# Patient Record
Sex: FEMALE | Race: WHITE | NOT HISPANIC OR LATINO | Employment: OTHER | URBAN - METROPOLITAN AREA
[De-identification: names, ages, dates, MRNs, and addresses within clinical notes are randomized per-mention and may not be internally consistent; named-entity substitution may affect disease eponyms.]

---

## 2017-04-27 ENCOUNTER — CONVERSION ENCOUNTER (OUTPATIENT)
Dept: MAMMOGRAPHY | Facility: HOSPITAL | Age: 52
End: 2017-04-27

## 2018-02-14 ENCOUNTER — OFFICE VISIT CONVERTED (OUTPATIENT)
Dept: SURGERY | Facility: CLINIC | Age: 53
End: 2018-02-14
Attending: SURGERY

## 2018-04-10 ENCOUNTER — OFFICE VISIT CONVERTED (OUTPATIENT)
Dept: SURGERY | Facility: CLINIC | Age: 53
End: 2018-04-10
Attending: NURSE PRACTITIONER

## 2018-08-13 ENCOUNTER — CONVERSION ENCOUNTER (OUTPATIENT)
Dept: MAMMOGRAPHY | Facility: HOSPITAL | Age: 53
End: 2018-08-13

## 2020-07-10 ENCOUNTER — HOSPITAL ENCOUNTER (OUTPATIENT)
Dept: GENERAL RADIOLOGY | Facility: HOSPITAL | Age: 55
Discharge: HOME | End: 2020-07-10
Attending: NURSE PRACTITIONER

## 2020-07-27 ENCOUNTER — CONVERSION ENCOUNTER (OUTPATIENT)
Dept: ORTHOPEDIC SURGERY | Facility: CLINIC | Age: 55
End: 2020-07-27

## 2020-07-27 ENCOUNTER — OFFICE VISIT CONVERTED (OUTPATIENT)
Dept: ORTHOPEDIC SURGERY | Facility: CLINIC | Age: 55
End: 2020-07-27
Attending: ORTHOPAEDIC SURGERY

## 2020-09-18 ENCOUNTER — HOSPITAL ENCOUNTER (OUTPATIENT)
Dept: GENERAL RADIOLOGY | Facility: HOSPITAL | Age: 55
Discharge: HOME | End: 2020-09-18
Attending: ORTHOPAEDIC SURGERY

## 2020-09-23 ENCOUNTER — OFFICE VISIT CONVERTED (OUTPATIENT)
Dept: ORTHOPEDIC SURGERY | Facility: CLINIC | Age: 55
End: 2020-09-23
Attending: ORTHOPAEDIC SURGERY

## 2020-09-23 ENCOUNTER — CONVERSION ENCOUNTER (OUTPATIENT)
Dept: ORTHOPEDIC SURGERY | Facility: CLINIC | Age: 55
End: 2020-09-23

## 2021-04-14 ENCOUNTER — OFFICE VISIT CONVERTED (OUTPATIENT)
Dept: SURGERY | Facility: CLINIC | Age: 56
End: 2021-04-14
Attending: NURSE PRACTITIONER

## 2021-05-06 ENCOUNTER — HOSPITAL ENCOUNTER (OUTPATIENT)
Dept: PREADMISSION TESTING | Facility: HOSPITAL | Age: 56
Discharge: HOME | End: 2021-05-06
Attending: SURGERY

## 2021-05-06 LAB — SARS-COV-2 RNA SPEC QL NAA+PROBE: NOT DETECTED

## 2021-05-10 NOTE — H&P
History and Physical      Patient Name: Izabela Shipman   Patient ID: [id number]   Sex: Female   YOB: 1965    Primary Care Provider: Jalyn ARMANDO   Referring Provider: Jalyn ARMANDO    Visit Date: July 27, 2020    Provider: Nash Bautista MD   Location: [facility]   Location Address: [address] [place] [address]  [id number]   Location Phone: [phone]          Chief Complaint  · Right elbow pain   · Right knee pain       History Of Present Illness  Izabela Shipman is a 54 year old /White female who is here for right knee and right elbow pain. The elbow pain started 6 months ago and the knee pain started 1 year ago without injury or trauma. Patient states she does a lot of repetitive motion with her arm, resulting in pain on the lateral side. Patient states pain with gripping, denies numbness or tingling. Patient states right knee pain is on the medial side. She states her knee gives-way and pain with going down stairs. She has tried Voltaren gel and pain medication.       Past Medical History  Arthritis; Hyperthyroidism; Hypothyroidism; Limb Swelling; Stress Incontinence; Thyroid disorder; Ulcer; Urge Incontinence         Past Surgical History  Colonoscopy; Colonoscopy and upper gastrointestinal endoscopy; Hysterectomy; Metal implants; Neck Surgery; Rotator Cuff repair; [place] Tooth Extraction         Medication List  buspirone 30 mg oral tablet; Carafate 100 mg/mL oral suspension; Nexium 40 mg oral capsule,delayed release(DR/EC); NP Thyroid 90 mg oral tablet; Once Daily oral tablet; oxybutynin chloride 5 mg oral tablet extended release 24hr; pantoprazole 40 mg oral tablet,delayed release (DR/EC); venlafaxine 75 mg oral tablet         Allergy List  NO KNOWN DRUG ALLERGIES         Family Medical History  Stroke; Heart Disease; Cancer, Unspecified; Diabetes, unspecified type; Renal Calculus; Family history of colon cancer; Family history of breast cancer         Social  "History  Alcohol (Never); Alcohol Use (Never); Caffeine (Never); Homemaker.; lives alone; Recreational Drug Use (Never); Second hand smoke exposure (Never); Single.; Tobacco (Current some day)         Review of Systems  · Constitutional  o Denies  o : fever, chills, weight loss  · Cardiovascular  o Denies  o : chest pain, shortness of breath  · Gastrointestinal  o Denies  o : liver disease, heartburn, nausea, blood in stools  · Genitourinary  o Denies  o : painful urination, blood in urine  · Integument  o Denies  o : rash, itching  · Neurologic  o Denies  o : headache, weakness, loss of consciousness  · Musculoskeletal  o Admits  o : painful, swollen joints  · Psychiatric  o Admits  o : anxiety, depression  o Denies  o : drug/alcohol addiction      Vitals  Date Time BP Position Site L\R Cuff Size HR RR TEMP (F) WT  HT  BMI kg/m2 BSA m2 O2 Sat        07/27/2020 02:00 PM      101 - R   153lbs 0oz 5'  1\" 28.91 1.73 97 %          Physical Examination  · Constitutional  o Appearance  o : well developed, well-nourished, no obvious deformities present  · Head and Face  o Head  o :   § Inspection  § : normocephalic  o Face  o :   § Inspection  § : no facial lesions  · Eyes  o Conjunctivae  o : conjunctivae normal  o Sclerae  o : sclerae white  · Ears, Nose, Mouth and Throat  o Ears  o :   § External Ears  § : appearance within normal limits  § Hearing  § : intact  o Nose  o :   § External Nose  § : appearance normal  · Neck  o Inspection/Palpation  o : normal appearance  o Range of Motion  o : full range of motion  · Respiratory  o Respiratory Effort  o : breathing unlabored  o Inspection of Chest  o : normal appearance  o Auscultation of Lungs  o : no audible wheezing or rales  · Cardiovascular  o Heart  o : regular rate  · Gastrointestinal  o Abdominal Examination  o : soft and non-tender  · Skin and Subcutaneous Tissue  o General Inspection  o : intact, no rashes  · Psychiatric  o General  o : Alert and oriented " x3  o Judgement and Insight  o : judgment and insight intact  o Mood and Affect  o : mood normal, affect appropriate  · Right Elbow  o Inspection  o : Mild swelling. No skin discoloration. Palpable tenderness over the lateral epicondyle. Full extension. Full flexion. Full supination and pronation. Neurovascularly grossly intact. Sensation grossly intact. 2+ radial and ulnar pulses.   · Right Knee  o Inspection  o : No skin discoloration, atrophy, or swelling. Palpable tenderness on medial joint line. Full extension. Full flexion. Positive Marilyn's. Positive Apley's. Calf supple and nontender. Neurovascularly grossly intact. Sensation grossly intact. 2+ dorsal pedal and posterior tibialis pulses.   · Injection Note/Aspiration Note  o Site  o : right elbow  o Procedure  o : After educating the patient, patient gave consent for procedure. After using Chloraprep, the injection was given. The patient tolerated the procedure well.  o Medication  o : 80 mg of DepoMedrol with 1cc of 1% Lidocaine  · Imaging  o Imaging  o : X-rays of right knee performed at [facility] on 07/10/2020 reviewed and are negative for fracture or dislocation.               Assessment  · Lateral epicondylitis of right elbow     726.32/M77.11  · Right elbow pain     719.42/M25.521  · Right knee pain, unspecified chronicity     719.46/M25.561      Plan  · Orders  o Depo-Medrol injection 80mg () - - 07/27/2020   Lot [id number] Exp 07 2021 [employer] Administered by ALLYSON Bautista MD  o Elbow-Lat Single Tendon (Injection) (35331) - - 07/27/2020   Lot [id number] Exp 02 2022 [employer] Administered by ALLYSON Bautista MD  · Medications  o Medications have been Reconciled  o Transition of Care or Provider Policy  · Instructions  o Reviewed the patient's Past Medical, Social, and Family history as well as the ROS at today's visit, no changes.  o Call or return if worsening symptoms.  o Exercise handout given.  o Follow up after MRI  of right knee.  o Right elbow injection.  o This note was transcribed by Kitty lacey/vianney.            Electronically Signed by: Kitty Interiano-, Other -Author on July 29, 2020 08:33:46 PM  Electronically Co-signed by: THERESA Koch-MARILYN -Reviewer on July 30, 2020 09:09:04 AM  Electronically Co-signed by: Nash Bautista MD -Reviewer on July 31, 2020 12:28:36 PM

## 2021-05-11 ENCOUNTER — HOSPITAL ENCOUNTER (OUTPATIENT)
Dept: GASTROENTEROLOGY | Facility: HOSPITAL | Age: 56
Setting detail: HOSPITAL OUTPATIENT SURGERY
Discharge: HOME | End: 2021-05-11
Attending: SURGERY

## 2021-05-11 LAB
C DIFF TOX B STL QL CT TISS CULT: NEGATIVE
CONV 027 TOXIN: NEGATIVE

## 2021-05-11 NOTE — H&P
History and Physical      Patient Name: Izabela Shipman   Patient ID: [id number]   Sex: Female   YOB: 1965    Primary Care Provider: Jalyn ARMANDO   Referring Provider: Jalyn ARMANDO    Visit Date: April 14, 2021    Provider: TR Valentino   Location: [facility]   Location Address: [address] [place] [address]  [id number]   Location Phone: [phone]          Chief Complaint  · Age 50 or over  · FH of colon cancer  · Abdominal Pain  · Difficulty Swallowing  · Diarrhea  · GERD  · Requesting EGD and Colonoscopy      History Of Present Illness  The patient is a 55 year old /White female presenting to the Surgical Specialist office on a referral from Jalyn ARMANDO.   Izabela Shipman needs to have a diagnostic colonoscopy and EGD.   Patient states that they have had a colonoscopy. 3 years ago   Patient currently complains of: diarrhea, GERD, difficulty swallowing, and abdominal pain   Patient Does have family history of colon cancer. Mother and 2 maternal uncles      Patient presents today on referral from Dr. Rubio Keenan.  Patient presents today with complaints of dysphagia, GERD, lower abdominal pain and diarrhea.  Patient reports that she has been having trouble swallowing pills and foods especially breads and meats, feeling as if they get stuck in her anterior chest.  Reports that she has GERD symptoms and takes Pepcid.  Admits to lower abdominal pain and diarrhea for the last 6 months.  Admits to family history of colorectal cancer with her mother and 2 maternal uncles.    3/18: EGD & Colonoscopy ([facility]): Erosive esophagitis; gastritis; normal colon.     3/16: EGD ([facility]): gastritis; duodenitis.    1/15: EGD & Colonoscopy ([facility]); Reflux esophagitis; gastritis; hiatal hernia; normal colon.     5/11: EGD ([facility]); H. pylori gastritis.    12/07: Colonoscopy ([facility]); normal colon.     4/02: Colonoscopy ([facility]); normal colon; proctitis; internal  hemorrhoids.       Past Medical History  Disease Name Date Onset Notes   Arthritis --  --    Bladder disorder --  --    Hyperthyroidism --  --    Hypothyroidism --  --    Limb Swelling --  --    Stress Incontinence 09/22/2016 --    Thyroid disorder --  --    Ulcer --  --    Urge Incontinence 09/22/2016 --          Past Surgical History  Procedure Name Date Notes   Colonoscopy --  --    Colonoscopy and upper gastrointestinal endoscopy --  --    Hysterectomy --  --    Hysterectomy-Abdominal --  --    Metal implants --  --    Neck Surgery --  --    Rotator Cuff repair --  --    [place] Tooth Extraction --  --          Medication List  Name Date Started Instructions   desvenlafaxine 50 mg oral tablet extended release 24 hr  take 1 tablet (50 mg) by oral route once daily   levothyroxine 100 mcg oral capsule  take 1 capsule (100 mcg) by oral route once daily   Nexium 40 mg oral capsule,delayed release(/EC) 04/10/2018 take 1 capsule (40 mg) by oral route once daily for 30 days   Once Daily oral tablet  take 1 tablet by oral route daily   oxcarbazepine 300 mg oral tablet  take 1 tablet (300 mg) by oral route once daily   Valium 2 mg oral tablet  take 1 tablet (2 mg) by oral route 3 times per day as needed         Allergy List  Allergen Name Date Reaction Notes   NO KNOWN DRUG ALLERGIES --  --  --        Allergies Reconciled  Family Medical History  Disease Name Relative/Age Notes   Stroke Brother/   Brother   Heart Disease Brother/   Brother   Cancer, Unspecified Brother/  Mother/   Mother; Brother   Diabetes, unspecified type Brother/   Brother   Renal Calculus Father/   Father   Family history of colon cancer Mother/50s  Uncle/   Mother/50s; Uncle/50s  Mother/50s; Uncle/50s  Mother/50s   Family history of breast cancer  Aunt/60s  Aunt/60s  Aunt/50s  Aunt/60s   Bladder calculus Father/   Father         Social History  Finding Status Start/Stop Quantity Notes   Alcohol Never --/-- --  04/14/2021 - drinks no   Alcohol  "Use Never --/-- --  does not drink   Caffeine Never --/-- --  drinks no  drinks occasionally; coffee; 1-2 times per day   Homemaker. --  --/-- --  --    lives alone --  --/-- --  --    Recreational Drug Use Never --/-- --  no   Second hand smoke exposure Never --/-- --  no  yes   Single. --  --/-- --  --    Tobacco Current every day --/-- 1 PPD current some day smoker, 0.5 pack per day, smoked 21-30 years  former smoker; started smoking at age 3; quit smoking at age 51; smoked 1 cigarette(s) per day  current everyday smoker; started smoking at age 3; smoked 2 cigarette(s) per day  current everyday smoker; started smoking at age 17; smoked 1 cigarette(s) per day         Review of Systems  · Constitutional  o Denies  o : fever, chills  · Eyes  o Denies  o : yellowish discoloration of eyes  · HENT  o Denies  o : difficulty swallowing  · Cardiovascular  o Denies  o : chest pain, chest pain on exertion  · Respiratory  o Denies  o : shortness of breath  · Gastrointestinal  o Admits  o : diarrhea, dysphagia, heartburn, reflux, abdominal pain  o Denies  o : nausea, vomiting, constipation  · Genitourinary  o Denies  o : abnormal color of urine  · Integument  o Denies  o : rash  · Neurologic  o Denies  o : tingling or numbness  · Musculoskeletal  o Denies  o : joint pain  · Endocrine  o Denies  o : weight gain, weight loss      Vitals  Date Time BP Position Site L\R Cuff Size HR RR TEMP (F) WT  HT  BMI kg/m2 BSA m2 O2 Sat FR L/min FiO2        04/14/2021 01:29 PM       13  153lbs 2oz 5'  1\" 28.93 1.73             Physical Examination  · Constitutional  o Appearance  o : well developed, well-nourished, patient in no apparent distress  · Head and Face  o Head  o :   § Inspection  § : atraumatic, normocephalic  o Face  o :   § Inspection  § : no facial lesions  · Eyes  o Conjunctivae  o : conjunctivae normal  o Sclerae  o : sclerae white  · Neck  o Inspection/Palpation  o : normal appearance, no masses or tenderness, " trachea midline  · Respiratory  o Respiratory Effort  o : breathing unlabored  · Skin and Subcutaneous Tissue  o General Inspection  o : no lesions present, no areas of discoloration, skin turgor normal, texture normal  · Neurologic  o Mental Status Examination  o :   § Orientation  § : grossly oriented to person, place and time  § Attention  § : attention normal, concentration abilities normal  § Fund of Knowledge  § : fund of knowledge within normal limits, patient aware of current events  o Gait and Station  o : normal gait, able to stand without difficulty  · Psychiatric  o Judgement and Insight  o : judgment and insight intact  o Mood and Affect  o : mood normal, affect appropriate              Assessment  · Diarrhea     787.91/R19.7  · Difficulty in swallowing     787.20/R13.10  · Family History of Colon Cancer     V16.0/Z80.0  · GERD (gastroesophageal reflux disease)     530.81/K21.9  · Pre-op testing     V72.84/Z01.818    Problems Reconciled  Plan  · Orders  o Consent for Colonoscopy with Possible Biopsy - Possible risks/complications, benefits, and alternatives to surgical or invasive procedure have been explained to patient and/or legal guardian. -Patient has been evaluated and can tolerate anesthesia and/or sedation. Risks, benefits, and alternatives to anesthesia and sedation have been explained to patient and/or legal guardian. (51077) - 787.91/R19.7, V16.0/Z80.0, 787.20/R13.10, 530.81/K21.9 - 05/11/2021  o Consent for Esophagogastroduodenoscopy (EGD) with dilation - Possible risks/complications, benefits, and alternatives to surgical or invasive procedure have been explained to patient and/or legal guardian. - Patient has been evaluated and can tolerate anesthesia and/or sedation. Risks, benefits, and alternatives to anesthesia and sedation have been explained to patient and/or legal guardian. (60446) - 787.91/R19.7, V16.0/Z80.0, 787.20/R13.10, 530.81/K21.9 - 05/11/2021  o [facility] Pre-Op Covid-19  Screening (22861) - V72.84/Z01.818 - 05/06/2021   [facility]  · Medications  o Medications have been Reconciled  o Transition of Care or Provider Policy  · Instructions  o Surgical Facility: [facility]  o Handouts Provided Pre-Procedure Instructions including date, time, and location of procedure.   o PLAN: Proceeed with colonoscopy. Patient understands risks/benefits and is willing to proceed.   o PLAN: Proceeed with EGD. Patient understands risks/benefits and is willing to proceed.   o ***Surgical Orders***  o RISK AND BENEFITS:  o Given these options, the patient has verbally expressed an understanding of the risks of the surgery and finds these risks acceptable. Will proceed with surgery as soon as possible.  o O.R. PREP: Per protocol   o IV: Per Anesthesia  o Please sign permit for: Colonoscopy with possible biopsies by Dr. Clark.  o Please sign permit for: Esophagogastroduodenoscopy with possible biopsies and dilation by Dr. Clark.   o The above History and Physical Examination has been completed within 30 days of admission.  o ***Patient Status***  o Outpatient  o Follow up in the in the office post procedure.  o Electronically Identified Patient Education Materials Provided Electronically  · Disposition  o EMR dragon/transcription disclaimer: Much of this encounter note is an electronic transcription/translation of spoken language to printed text. Electronic translation of spoken language may permit erroneous, or at times nonsensical words or phrases to be inadvertently trasncribed; although I have reviewed the note for such errors, some may still exist.  · Referrals  o ID: [id number] Date: 04/14/2021 Type: Inbound  Specialty: General Surgery            Electronically Signed by: TR Valentino -Author on April 14, 2021 02:17:50 PM

## 2021-05-13 NOTE — PROGRESS NOTES
Progress Note      Patient Name: Izabela Shipman   Patient ID: [id number]   Sex: Female   YOB: 1965    Primary Care Provider: Jalyn ARMANDO   Referring Provider: Jalyn ARMANDO    Visit Date: September 23, 2020    Provider: Nash Bautista MD   Location: [facility]   Location Address: [address] [place] [address]  [id number]   Location Phone: [phone]          Chief Complaint  · Right Knee Pain  · Right Shoulder Pain      History Of Present Illness  Izabela Shipman is a 54 year old /White female who presents today to [facility].      Patient presents today with a follow-up for right knee pain and right shoulder pain. Patient has had shoulder pain that started 6 months ago and the knee pain that started 1 year ago. Patient denies any trauma or injury to her knee. Patient has been experiencing pain with gripping but denies numbness and tingling. Patient states she feels like that same kind of pain from 7 years ago when she tore her RTC. Patient is experiencing pain on the medial aspect of her knee and states her knees gives way when she walks down the stairs. Patient presents today with MRI results of her knee.                  Past Medical History  Arthritis; Hyperthyroidism; Hypothyroidism; Limb Swelling; Stress Incontinence; Thyroid disorder; Ulcer; Urge Incontinence         Past Surgical History  Colonoscopy; Colonoscopy and upper gastrointestinal endoscopy; Hysterectomy; Metal implants; Neck Surgery; Rotator Cuff repair; [place] Tooth Extraction         Medication List  buspirone 30 mg oral tablet; Carafate 100 mg/mL oral suspension; Nexium 40 mg oral capsule,delayed release(DR/EC); NP Thyroid 90 mg oral tablet; Once Daily oral tablet; oxybutynin chloride 5 mg oral tablet extended release 24hr; pantoprazole 40 mg oral tablet,delayed release (DR/EC); venlafaxine 75 mg oral tablet         Allergy List  NO KNOWN DRUG ALLERGIES       Allergies  "Reconciled  Family Medical History  Stroke; Heart Disease; Cancer, Unspecified; Diabetes, unspecified type; Renal Calculus; Family history of colon cancer; Family history of breast cancer         Social History  Alcohol (Never); Alcohol Use (Never); Caffeine (Never); Homemaker.; lives alone; Recreational Drug Use (Never); Second hand smoke exposure (Never); Single.; Tobacco (Current some day)         Review of Systems  · Constitutional  o Denies  o : fever, chills, weight loss  · Cardiovascular  o Denies  o : chest pain, shortness of breath  · Gastrointestinal  o Denies  o : liver disease, heartburn, nausea, blood in stools  · Genitourinary  o Denies  o : painful urination, blood in urine  · Integument  o Denies  o : rash, itching  · Neurologic  o Denies  o : headache, weakness, loss of consciousness  · Musculoskeletal  o Denies  o : painful, swollen joints  · Psychiatric  o Denies  o : drug/alcohol addiction, anxiety, depression      Vitals  Date Time BP Position Site L\R Cuff Size HR RR TEMP (F) WT  HT  BMI kg/m2 BSA m2 O2 Sat        09/23/2020 09:14 AM      73 - R   151lbs 0oz 5'  1\" 28.53 1.72 99 %          Physical Examination  · Constitutional  o Appearance  o : well developed, well-nourished, no obvious deformities present  · Head and Face  o Head  o :   § Inspection  § : normocephalic  o Face  o :   § Inspection  § : no facial lesions  · Eyes  o Conjunctivae  o : conjunctivae normal  o Sclerae  o : sclerae white  · Ears, Nose, Mouth and Throat  o Ears  o :   § External Ears  § : appearance within normal limits  § Hearing  § : intact  o Nose  o :   § External Nose  § : appearance normal  · Neck  o Inspection/Palpation  o : normal appearance  o Range of Motion  o : full range of motion  · Respiratory  o Respiratory Effort  o : breathing unlabored  o Inspection of Chest  o : normal appearance  o Auscultation of Lungs  o : no audible wheezing or rales  · Cardiovascular  o Heart  o : regular " rate  · Gastrointestinal  o Abdominal Examination  o : soft and non-tender  · Skin and Subcutaneous Tissue  o General Inspection  o : intact, no rashes  · Psychiatric  o General  o : Alert and oriented x3  o Judgement and Insight  o : judgment and insight intact  o Mood and Affect  o : mood normal, affect appropriate  · Right Shoulder  o Inspection  o : Sensation grossly intact. Neurovascular intact. Pulses pleasant. No swelling, skin discoloration or atrophy. Pain with drop arm test and empty can test. Mild pain with Hawkin's and Neer's. Good tone of deltoid, biceps, triceps, wrist extensors, and wrist flexors. Shoulder ROM with pain.   · Right Knee  o Inspection  o : No swelling, skin discoloration or atrophy. Tenderness on medial joint line. Full flexion and extension. Positive Apley's. Positive McMurrays. Calf supple, non-tender. Non-tender patella. 2+ dorsal pedal and posterior tibialis pulses. Good strength in quadriceps, hamstrings, dorsiflexors, and plantar flexors. Antalgic gait.   · In Office Procedures  o View  o : AP/LATERAL  o Site  o : right, shoulder   o Indication  o : Right shoulder pain   o Study  o : X-rays ordered, taken in the office, and reviewed today.  · Imaging  o Imaging  o : [MRI RIGHT KNEE] 1. Complex tear of the posterior horn and body of the medial meniscus with extrusion. 2. Mild to moderate tricompartmental osteoarthritis as above. 3. Trace to small joint effusion with a moderate-sized partially ruptured Baker''s cyst..           Assessment  · Right Knee MMT (medial meniscus tear)     836.0/S83.249A  · Right knee pain, unspecified chronicity     719.46/M25.561  · Right shoulder pain, unspecified chronicity     719.41/M25.511  · Rotator cuff tear of right shoulder     840.4/M75.100      Plan  · Orders  o Scapula (Right) [facility] ([id number]) - 719.41/M25.511 - 09/23/2020  · Medications  o Medications have been Reconciled  o Transition of Care or Provider  Policy  · Instructions  o Dr. Bautista saw and examined the patient and agrees with plan.   o X-rays reviewed by Dr. Bautista.  o Reviewed the patient's Past Medical, Social, and Family history as well as the ROS at today's visit, no changes.  o Call or return if worsening symptoms.  o Discussed surgery.  o Risks/benefits discussed with patient including, but not limited to: infection, bleeding, neurovascular damage, malunion, nonunion, aesthetic deformity, need for further surgery, and death.  o Surgery pamphlet given.  o Exercise handout given.  o Follow Up PRN.  o This note was transcribed by Monse Villanueva. vianney [email or web address] Discussed diagnosis and treatment options with the patient. Discussed surgical intervention for her knee and injections. Patient isn't ready for surgery at this time and will consider to schedule for surgery for her knee. Patient will call back to schedule surgery but will opt for at home exercises and PT.            Electronically Signed by: Monse Villanueva-, Other -Author on September 24, 2020 11:04:52 AM  Electronically Co-signed by: Nash Bautista MD -Reviewer on September 24, 2020 05:08:16 PM

## 2021-05-14 VITALS — BODY MASS INDEX: 28.51 KG/M2 | OXYGEN SATURATION: 99 % | WEIGHT: 151 LBS | HEIGHT: 61 IN | HEART RATE: 73 BPM

## 2021-05-14 VITALS — WEIGHT: 153.12 LBS | HEIGHT: 61 IN | BODY MASS INDEX: 28.91 KG/M2 | RESPIRATION RATE: 13 BRPM

## 2021-05-15 VITALS — HEART RATE: 101 BPM | WEIGHT: 153 LBS | HEIGHT: 61 IN | BODY MASS INDEX: 28.89 KG/M2 | OXYGEN SATURATION: 97 %

## 2021-05-16 VITALS — RESPIRATION RATE: 14 BRPM | WEIGHT: 139.12 LBS | HEIGHT: 62 IN | BODY MASS INDEX: 25.6 KG/M2

## 2021-05-16 VITALS — BODY MASS INDEX: 27.07 KG/M2 | HEIGHT: 61 IN | WEIGHT: 143.37 LBS | RESPIRATION RATE: 16 BRPM

## 2021-05-25 ENCOUNTER — OFFICE VISIT CONVERTED (OUTPATIENT)
Dept: SURGERY | Facility: CLINIC | Age: 56
End: 2021-05-25
Attending: SURGERY

## 2021-06-05 NOTE — PROGRESS NOTES
Progress Note      Patient Name: Izabela Shipman   Patient ID: [id number]   Sex: Female   YOB: 1965    Primary Care Provider: Jalyn ARMANDO   Referring Provider: Jalyn ARMANDO    Visit Date: May 25, 2021    Provider: Dick Clark MD   Location: [facility]   Location Address: [address] [place] [address]  [id number]   Location Phone: [phone]          Chief Complaint  · Follow up Surgery      History Of Present Illness  Izabela Shipman is a 55 year old /White female who presents today for f/u after upper and lower endoscopy      Patient is here for follow-up after I performed an EGD and a colonoscopy on 5/11/2021.  The indications for the procedure are available in my procedure note dated 5/11/2021.  Regarding the colonoscopy, I removed a tubular adenoma and the patient needs to get her next colonoscopy in 3 years.  Notably patient's mother and 2 maternal uncles had colon cancer.  Regarding the EGD, there was a small hiatal hernia and there was evidence of gastritis confirmed with biopsy.  Biopsies showed no evidence of H. pylori.  The patient takes Pepcid.  We talked about stopping Pepcid and changing to omeprazole.  She is agreeable with that.  Plan is to give a prescription for omeprazole 40 mg each morning with the a 6-month refill and to get another colonoscopy in 3 years.  I will send a letter to [facility] the referring clinician and ask her to take over the medicine refill after 6 months.       Past Medical History  Disease Name Date Onset Notes   Arthritis --  --    Bladder disorder --  --    Hyperthyroidism --  --    Hypothyroidism --  --    Limb Swelling --  --    Stress Incontinence 09/22/2016 --    Thyroid disorder --  --    Ulcer --  --    Urge Incontinence 09/22/2016 --          Past Surgical History  Procedure Name Date Notes   Colonoscopy --  --    Colonoscopy and upper gastrointestinal endoscopy --  --    Hysterectomy --  --     Hysterectomy-Abdominal --  --    Metal implants --  --    Neck Surgery --  --    Rotator Cuff repair --  --    [place] Tooth Extraction --  --          Medication List  Name Date Started Instructions   desvenlafaxine 50 mg oral tablet extended release 24 hr  take 1 tablet (50 mg) by oral route once daily   levothyroxine 100 mcg oral capsule  take 1 capsule (100 mcg) by oral route once daily   omeprazole 20 mg oral capsule,delayed release(/EC) 05/25/2021 take 2 capsules by oral route in the morning   Once Daily oral tablet  take 1 tablet by oral route daily   oxcarbazepine 300 mg oral tablet  take 1 tablet (300 mg) by oral route once daily   oxycodone 10 mg oral tablet  take 1 tablet (10 mg) by oral route every 6 hours   Valium 2 mg oral tablet  take 1 tablet (2 mg) by oral route 3 times per day as needed         Allergy List  Allergen Name Date Reaction Notes   NO KNOWN DRUG ALLERGIES --  --  --          Family Medical History  Disease Name Relative/Age Notes   Stroke Brother/   Brother   Heart Disease Brother/   Brother   Cancer, Unspecified Brother/  Mother/   Mother; Brother   Diabetes, unspecified type Brother/   Brother   Renal Calculus Father/   Father   Family history of colon cancer Mother/50s  Uncle/   Mother/50s; Uncle/50s  Mother/50s; Uncle/50s  Mother/50s   Family history of breast cancer  Aunt/60s  Aunt/60s  Aunt/50s  Aunt/60s   Bladder calculus Father/   Father         Social History  Finding Status Start/Stop Quantity Notes   Alcohol Never --/-- --  04/14/2021 - drinks no   Alcohol Use Never --/-- --  does not drink   Caffeine Never --/-- --  drinks no  drinks occasionally; coffee; 1-2 times per day   Homemaker. --  --/-- --  --    lives alone --  --/-- --  --    Recreational Drug Use Never --/-- --  no   Second hand smoke exposure Never --/-- --  no  yes   Single. --  --/-- --  --    Tobacco Current every day --/-- 1 PPD current some day smoker, 0.5 pack per day, smoked 21-30 years  former  "smoker; started smoking at age 3; quit smoking at age 51; smoked 1 cigarette(s) per day  current everyday smoker; started smoking at age 3; smoked 2 cigarette(s) per day  current everyday smoker; started smoking at age 17; smoked 1 cigarette(s) per day         Review of Systems  · Constitutional  o Denies  o : fever, chills  · Cardiovascular  o Denies  o : chest pain on exertion  · Respiratory  o Denies  o : shortness of breath, cough      Vitals  Date Time BP Position Site L\R Cuff Size HR RR TEMP (F) WT  HT  BMI kg/m2 BSA m2 O2 Sat FR L/min FiO2        05/25/2021 08:57 AM       14  150lbs 6oz 5'  1\" 28.41 1.71                 Assessment  · Tubular adenoma     229.9/D36.9  · Gastritis     535.50/K29.70      Plan  · Medications  o Medications have been Reconciled  o Transition of Care or Provider Policy  · Instructions  o See Above HPI section.  o Electronically Identified Patient Education Materials Provided Electronically  · Referrals  o ID: [id number] Date: 04/14/2021 Type: Inbound  Specialty: General Surgery            Electronically Signed by: Dick Clark MD -Author on May 25, 2021 09:28:43 AM  "

## 2021-06-27 ENCOUNTER — APPOINTMENT (OUTPATIENT)
Dept: GENERAL RADIOLOGY | Facility: HOSPITAL | Age: 56
End: 2021-06-27

## 2021-06-27 ENCOUNTER — APPOINTMENT (OUTPATIENT)
Dept: CT IMAGING | Facility: HOSPITAL | Age: 56
End: 2021-06-27

## 2021-06-27 ENCOUNTER — HOSPITAL ENCOUNTER (EMERGENCY)
Facility: HOSPITAL | Age: 56
Discharge: HOME | End: 2021-06-27
Attending: EMERGENCY MEDICINE | Admitting: EMERGENCY MEDICINE

## 2021-06-27 VITALS
TEMPERATURE: 98 F | OXYGEN SATURATION: 98 % | WEIGHT: 151.68 LBS | BODY MASS INDEX: 28.64 KG/M2 | HEART RATE: 71 BPM | DIASTOLIC BLOOD PRESSURE: 76 MMHG | HEIGHT: 61 IN | SYSTOLIC BLOOD PRESSURE: 121 MMHG | RESPIRATION RATE: 20 BRPM

## 2021-06-27 DIAGNOSIS — S16.1XXA NECK STRAIN, INITIAL ENCOUNTER: Primary | ICD-10-CM

## 2021-06-27 DIAGNOSIS — G44.209 ACUTE NON INTRACTABLE TENSION-TYPE HEADACHE: ICD-10-CM

## 2021-06-27 PROCEDURE — 70450 CT HEAD/BRAIN W/O DYE: CPT

## 2021-06-27 PROCEDURE — 99282 EMERGENCY DEPT VISIT SF MDM: CPT

## 2021-06-27 PROCEDURE — 72072 X-RAY EXAM THORAC SPINE 3VWS: CPT

## 2021-06-27 PROCEDURE — 72100 X-RAY EXAM L-S SPINE 2/3 VWS: CPT

## 2021-06-27 PROCEDURE — 72125 CT NECK SPINE W/O DYE: CPT

## 2021-07-08 ENCOUNTER — TRANSCRIBE ORDERS (OUTPATIENT)
Dept: ADMINISTRATIVE | Facility: HOSPITAL | Age: 56
End: 2021-07-08

## 2021-07-08 DIAGNOSIS — Z12.31 ENCOUNTER FOR SCREENING MAMMOGRAM FOR BREAST CANCER: Primary | ICD-10-CM

## 2021-07-15 VITALS — RESPIRATION RATE: 14 BRPM | BODY MASS INDEX: 28.39 KG/M2 | WEIGHT: 150.37 LBS | HEIGHT: 61 IN

## 2021-07-19 ENCOUNTER — HOSPITAL ENCOUNTER (OUTPATIENT)
Dept: MAMMOGRAPHY | Facility: HOSPITAL | Age: 56
Discharge: HOME | End: 2021-07-19
Admitting: NURSE PRACTITIONER

## 2021-07-19 DIAGNOSIS — Z12.31 ENCOUNTER FOR SCREENING MAMMOGRAM FOR BREAST CANCER: ICD-10-CM

## 2021-07-19 PROCEDURE — 77067 SCR MAMMO BI INCL CAD: CPT

## 2021-07-19 PROCEDURE — 77063 BREAST TOMOSYNTHESIS BI: CPT

## 2021-08-18 ENCOUNTER — TELEPHONE (OUTPATIENT)
Dept: SURGERY | Facility: CLINIC | Age: 56
End: 2021-08-18

## 2021-08-18 RX ORDER — OMEPRAZOLE 40 MG/1
40 CAPSULE, DELAYED RELEASE ORAL DAILY
Qty: 30 CAPSULE | Refills: 12 | Status: SHIPPED | OUTPATIENT
Start: 2021-08-18 | End: 2023-03-17 | Stop reason: HOSPADM

## 2021-11-19 ENCOUNTER — OFFICE VISIT (OUTPATIENT)
Dept: SLEEP MEDICINE | Facility: HOSPITAL | Age: 56
End: 2021-11-19

## 2021-11-19 VITALS
HEIGHT: 61 IN | DIASTOLIC BLOOD PRESSURE: 74 MMHG | TEMPERATURE: 97.4 F | SYSTOLIC BLOOD PRESSURE: 123 MMHG | BODY MASS INDEX: 27.38 KG/M2 | WEIGHT: 145 LBS | OXYGEN SATURATION: 97 % | HEART RATE: 84 BPM

## 2021-11-19 DIAGNOSIS — G47.63 SLEEP-RELATED BRUXISM: ICD-10-CM

## 2021-11-19 DIAGNOSIS — G47.8 NON-RESTORATIVE SLEEP: ICD-10-CM

## 2021-11-19 DIAGNOSIS — E66.3 OVERWEIGHT (BMI 25.0-29.9): ICD-10-CM

## 2021-11-19 DIAGNOSIS — G47.00 INSOMNIA, UNSPECIFIED TYPE: Primary | ICD-10-CM

## 2021-11-19 DIAGNOSIS — Z72.821 INADEQUATE SLEEP HYGIENE: ICD-10-CM

## 2021-11-19 DIAGNOSIS — G47.10 HYPERSOMNIA: ICD-10-CM

## 2021-11-19 PROCEDURE — G0463 HOSPITAL OUTPT CLINIC VISIT: HCPCS

## 2021-11-19 PROCEDURE — 99204 OFFICE O/P NEW MOD 45 MIN: CPT | Performed by: FAMILY MEDICINE

## 2021-11-19 RX ORDER — ZOLPIDEM TARTRATE 5 MG/1
TABLET ORAL
Qty: 2 TABLET | Refills: 0 | Status: SHIPPED | OUTPATIENT
Start: 2021-11-19 | End: 2023-03-17 | Stop reason: HOSPADM

## 2021-11-19 NOTE — PROGRESS NOTES
[facility] New Patient[facility]Consultation       Reason for Consultation: Insomnia      Patient Care Team:  Jalyn Holt APRN as PCP - General (Nurse Practitioner)  Candis Redd MD as Consulting Physician (Sleep Medicine)      History of present illness:  Thank you for asking me to see your patient.  The patient is a 55 y.o. female with anxiety depression and GERD presents today with concern for sleep disorder.  Patient reports she does not sleep for days eventually crashed for 2 or 3 hours and always wakes up exhausted.  No history of prior sleep study or tonsillectomy.  Does not use a sleep aid.  Reports snoring waking with dry mouth.  Reports sleep-related bruxism sweating excessively during sleep restless sleep nocturia.  Reports family history of sleep apnea and restless legs.  Patient is overweight with BMI 27.4.    Insomnia has been an issue for about 1.5 years.  Steadily getting worse.  Has tried several sleep aids with her psychiatrist including Ambien Lunesta trazodone; Ambien worked best however she eventually became tolerant to max dosage.  Other medications did not work as well.  Has chronic pain issues is on oxycodone tizanidine; also on oxcarbazepine.  Recently started on methylphenidate by psychiatrist for focus issues 10 mg twice daily.    Poor sleep hygiene    Sleep Schedule:  Bed time: Tries to go to bed around 11 PM but is asleep around 3 AM or 4 AM  Sleep latency: 3 hours  Wake time: 6 AM  Average hours slept: 2-3  Non-restorative sleep: Yes  Number of naps per day: 0  Rotating shifts?:  No  Nocturia: Yes  Electronics in bedroom: Yes    Excessive daytime sleepiness or drowsiness:Y  Any accidents at work due to sleepiness in the last 5 years:N  Any difficulty driving due to sleepiness or being drowsy: N  Weight changed in the last 5 years:Y - GAINED    Snoring:Y  Witnessed apneas:N  Have you ever awakened gasping for breath, coughing, choking or respiratory discomfort: N  Morning  "headaches: N  Awaken with a sore throat or dry mouth: Y    Any reports of leg jerking at night: N  Urge sensations: N  Does pain disrupt sleep: Y  Sweating during sleep: Y  Teeth grinding: Y    Any sudden episodes of sleep during the day: N  Sleep paralysis/hallucinations: N  Muscle weakness with laughing/anger: Y  Nightmares: N  Sleep walking: N    Are you sleepy when you increase your sleep time: N  Do you sleep better away from your own bed: N    ESS: 11    Social History: No tobacco alcohol or drug use 2 caffeinated beverages a day    Review of Systems:    A complete review of systems was done and all were negative with the exception of anxiety depression arthritis    Allergies:  Patient has no known allergies.    Family History: TOMMY yes       Current Outpatient Medications:   •  omeprazole (priLOSEC) 40 MG capsule, Take 1 capsule by mouth Daily., Disp: 30 capsule, Rfl: 12  •  zolpidem (Ambien) 5 MG tablet, Take one tab as needed for sleep at night of sleep study only. Do not use at home., Disp: 2 tablet, Rfl: 0    Vital Signs:    Vitals:    11/19/21 0900   BP: 123/74   Pulse: 84   Temp: 97.4 °F (36.3 °C)   SpO2: 97%   Weight: 65.8 kg (145 lb)   Height: 154.9 cm (61\")      Body mass index is 27.4 kg/m².         Physical Exam:   General Alert and oriented. No acute distress noted   Pharynx/Throat Class II/III Mallampati airway, large tongue, no evidence of redundant lateral pharyngeal tissue. No oral lesions. No thrush. Moist mucous membranes.   Head Normocephalic. Symmetrical. Atraumatic.    Nose No septal deviation. No drainage   Chest Wall Normal shape. Symmetric expansion with respiration. No tenderness.   Neck Trachea midline, no thyromegaly or adenopathy    Lungs Clear to auscultation bilaterally. No wheezes. No rhonchi. No rales. Respirations regular, even and unlabored.   Heart Regular rhythm and normal rate. Normal S1 and S2. No murmur   Abdomen Soft, non-tender and non-distended. Normal bowel sounds. No " masses.   Extremities Moves all extremities well. No edema   Psychiatric Normal mood and affect.       Impression:  1. Insomnia, unspecified type    2. Non-restorative sleep    3. Overweight (BMI 25.0-29.9)    4. Hypersomnia    5. Sleep-related bruxism    6. Inadequate sleep hygiene        Plan:    Good sleep hygiene measures should be maintained.  Weight loss would be beneficial in this patient who is overweight BMI 27.4.    I discussed the pathophysiology of obstructive sleep apnea with the patient.  We discussed the adverse outcomes associated with untreated sleep-disordered breathing.  We discussed treatment modalities of obstructive sleep apnea including CPAP device as well as oral mandibular advancement device. Sleep study will be scheduled to establish definitive diagnosis of sleep disorder breathing.  Weight loss will be strongly beneficial in order to reduce the severity of sleep-disordered breathing.  Patient has narrow oropharyngeal structure.  Caution during activities that require prolonged concentration is strongly advised.  Patient will be notified of sleep study results after sleep study is completed.  If sleep apnea is only mild,  oral mandibular advancement device may be one of the treatment options.  However if sleep apnea is moderately severe, CPAP treatment will be strongly encouraged.  The patient is not opposed to treatment with CPAP device if we confirm significant obstructive sleep apnea on polysomnography.     Prescription for Ambien was provided to bring to the Sleep lab to improve sleep efficiency.  Patient was asked to not take the Ambien at home.    In lab PSG negative will consider CBT-I.    Work on sleep hygiene; handout given.  Discussed in detail.  May consider Belsomra in the future; class D interaction with Belsomra and oxycodone and tizanidine.  However patient did tolerate Ambien with these medications in the past.    Thank you for allowing me to participate in your patient's  care.    Candis Redd MD  Sleep Medicine  11/19/21  10:52 EST

## 2021-12-01 ENCOUNTER — OFFICE VISIT (OUTPATIENT)
Dept: UROLOGY | Facility: CLINIC | Age: 56
End: 2021-12-01

## 2021-12-01 ENCOUNTER — PREP FOR SURGERY (OUTPATIENT)
Dept: OTHER | Facility: HOSPITAL | Age: 56
End: 2021-12-01

## 2021-12-01 VITALS — HEIGHT: 61 IN | WEIGHT: 143.8 LBS | RESPIRATION RATE: 14 BRPM | BODY MASS INDEX: 27.15 KG/M2

## 2021-12-01 DIAGNOSIS — R32 URINARY INCONTINENCE, UNSPECIFIED TYPE: Primary | ICD-10-CM

## 2021-12-01 DIAGNOSIS — N39.3 STRESS INCONTINENCE IN FEMALE: ICD-10-CM

## 2021-12-01 DIAGNOSIS — N39.3 STRESS INCONTINENCE IN FEMALE: Primary | ICD-10-CM

## 2021-12-01 LAB
BILIRUB BLD-MCNC: ABNORMAL MG/DL
CLARITY, POC: CLEAR
COLOR UR: YELLOW
EXPIRATION DATE: ABNORMAL
GLUCOSE UR STRIP-MCNC: NEGATIVE MG/DL
KETONES UR QL: NEGATIVE
LEUKOCYTE EST, POC: NEGATIVE
Lab: ABNORMAL
NITRITE UR-MCNC: NEGATIVE MG/ML
PH UR: 5.5 [PH] (ref 5–8)
PROT UR STRIP-MCNC: NEGATIVE MG/DL
RBC # UR STRIP: NEGATIVE /UL
SP GR UR: 1.03 (ref 1–1.03)
URINE VOLUME: 0
UROBILINOGEN UR QL: NORMAL

## 2021-12-01 PROCEDURE — 99204 OFFICE O/P NEW MOD 45 MIN: CPT | Performed by: UROLOGY

## 2021-12-01 PROCEDURE — 51798 US URINE CAPACITY MEASURE: CPT | Performed by: UROLOGY

## 2021-12-01 RX ORDER — ROSUVASTATIN CALCIUM 10 MG/1
10 TABLET, COATED ORAL
COMMUNITY
Start: 2021-11-02 | End: 2023-03-17 | Stop reason: HOSPADM

## 2021-12-01 RX ORDER — OXYCODONE AND ACETAMINOPHEN 10; 325 MG/1; MG/1
TABLET ORAL
COMMUNITY

## 2021-12-01 RX ORDER — DEXTROAMPHETAMINE SACCHARATE, AMPHETAMINE ASPARTATE, DEXTROAMPHETAMINE SULFATE AND AMPHETAMINE SULFATE 2.5; 2.5; 2.5; 2.5 MG/1; MG/1; MG/1; MG/1
TABLET ORAL
COMMUNITY
Start: 2021-11-09

## 2021-12-01 RX ORDER — FLUCONAZOLE 150 MG/1
150 TABLET ORAL AS NEEDED
Status: ON HOLD | COMMUNITY
Start: 2021-11-29 | End: 2021-12-13

## 2021-12-01 RX ORDER — TIZANIDINE 4 MG/1
TABLET ORAL
COMMUNITY
Start: 2021-11-05

## 2021-12-01 RX ORDER — LEVOTHYROXINE SODIUM 112 UG/1
112 TABLET ORAL DAILY
COMMUNITY
Start: 2021-09-16

## 2021-12-01 RX ORDER — CEFAZOLIN SODIUM 2 G/100ML
2 INJECTION, SOLUTION INTRAVENOUS ONCE
Status: CANCELLED | OUTPATIENT
Start: 2021-12-01 | End: 2021-12-01

## 2021-12-01 RX ORDER — ASPIRIN 81 MG/1
81 TABLET ORAL DAILY
Status: ON HOLD | COMMUNITY
End: 2021-12-13 | Stop reason: SDUPTHER

## 2021-12-01 RX ORDER — AMOXICILLIN 500 MG/1
CAPSULE ORAL
COMMUNITY
Start: 2021-11-29 | End: 2021-12-02

## 2021-12-01 RX ORDER — DESVENLAFAXINE SUCCINATE 50 MG/1
50 TABLET, EXTENDED RELEASE ORAL DAILY
COMMUNITY
Start: 2021-10-11

## 2021-12-01 NOTE — PROGRESS NOTES
UROLOGY OFFICE H&P NOTE    Subjective   HPI  Izabela Shipman is a 55 y.o. female presents for evaluation of urinary incontinence.  Patient reports large amounts of incontinence; cannot control bladder, starts and stops on its own.  Patient wears pads all the time.  Previously tried PF PT without benefit.  Also reports some pelvic pressure.  Leakage occurring for the last several years but has gotten worse over the last 8 months. Can be sitting on the couch and just start leaking. Leaks with intercourse. Walking will just have urine running down leg. [id number] episodically, everyday but not constant leakage. Can't hold it like she used to; some urgency but not the primary issue.   Was seen by Dr. Aquino, last in 2016; tried several meds without improvement; also tried meds through pcp in addition to pelvic floor therapy without improvement.   Denies issues with Annita, only one prior.       Results for orders placed or performed in visit on 12/01/21   Bladder Scan   Result Value Ref Range    Urine Volume 0    POC Urinalysis Dipstick, Automated    Specimen: Urine   Result Value Ref Range    Color Yellow Yellow, Straw, Dark Yellow, Jenna    Clarity, UA Clear Clear    Specific Gravity  1.030 1.005 - 1.030    pH, Urine 5.5 5.0 - 8.0    Leukocytes Negative Negative    Nitrite, UA Negative Negative    Protein, POC Negative Negative mg/dL    Glucose, UA Negative Negative, 1000 mg/dL (3+) mg/dL    Ketones, UA Negative Negative    Urobilinogen, UA Normal Normal    Bilirubin Small (1+) (A) Negative    Blood, UA Negative Negative    Lot Number [id number]     Expiration Date 2,022/11          Medical History:  Past Medical History:   Diagnosis Date   • Acid reflux    • Arthritis    • Dental contusion    • High cholesterol    • Pain    • Sleep disorder    • Thyroid disease    • Yeast infection         Social History:  Social History     Socioeconomic History   • Marital status:    Tobacco Use   • Smoking status: Former  Smoker   • Smokeless tobacco: Never Used   Vaping Use   • Vaping Use: Never used   Substance and Sexual Activity   • Alcohol use: Defer   • Sexual activity: Defer        Family History:  Family History   Problem Relation Age of Onset   • Ulcers Father    • Cancer Mother    • Thyroid disease Mother    • Heart disease Other    • Thyroid disease Other         Surgical History:  Past Surgical History:   Procedure Laterality Date   • COLONOSCOPY     • HYSTERECTOMY     • NECK SURGERY     • ROTATOR CUFF REPAIR          Allergies:  Allergies   Allergen Reactions   • Nsaids Unknown - High Severity   • Pregabalin Unknown - Low Severity     Other reaction(s): Unknown        Current Medications:  Current Outpatient Medications   Medication Sig Dispense Refill   • amoxicillin (AMOXIL) 500 MG capsule TAKE 1 CAPSULE BY MOUTH THREE TIMES A DAY FOR 7 DAYS     • amphetamine-dextroamphetamine (ADDERALL) 10 MG tablet TAKE 1 TABLET BY MOUTH TWO TIMES A DAY AS DIRECTED     • aspirin (aspirin) 81 MG EC tablet aspirin 81 mg tablet,delayed release     • desvenlafaxine (PRISTIQ) 50 MG 24 hr tablet Take 50 mg by mouth Daily.     • Diclofenac Sodium (VOLTAREN) 1 % gel gel APPLY 2 GRAMS TOPICALLY TO AFFECTED AREA FOUR TIMES A DAY     • fluconazole (DIFLUCAN) 150 MG tablet Take 150 mg by mouth As Needed.     • levothyroxine (SYNTHROID, LEVOTHROID) 112 MCG tablet Take 112 mcg by mouth Daily.     • omeprazole (priLOSEC) 40 MG capsule Take 1 capsule by mouth Daily. 30 capsule 12   • oxyCODONE-acetaminophen (PERCOCET)  MG per tablet oxycodone-acetaminophen 10 mg-325 mg tablet   TAKE 1 TABLET BY MOUTH EVERY 8 HOURS AS NEEDED FOR 30 DAYS     • rosuvastatin (CRESTOR) 10 MG tablet Take 10 mg by mouth every night at bedtime.     • tiZANidine (ZANAFLEX) 4 MG tablet TAKE 1 TABLET BY MOUTH EVERY 8 HOURS AS NEEDED FOR 30 DAYS     • zolpidem (Ambien) 5 MG tablet Take one tab as needed for sleep at night of sleep study only. Do not use at home. 2 tablet  "0     No current facility-administered medications for this visit.       Review of systems  Constitutional: Denies fever chills  GI: Denies nausea, vomiting    Objective     Vital Signs:   Resp 14   Ht 154.9 cm (61\")   Wt 65.2 kg (143 lb 12.8 oz)   BMI 27.17 kg/m²       Physical exam  No acute distress, well-nourished  Lungs: Clear, unlabored  CV: Regular rate, no chest retractions  Awake alert and oriented  Mood normal; affect normal  : Normal nothing appearing tissues; no palpable or visible urethral abnormalities; cough with Valsalva; no vaginal prolapse    Bladder Scan interpretation 12/01/2021    Estimation of residual urine via BVI 3000 [place] Bladder Scan  Residual Urine: 0 ml  Indication: Urinary incontinence, unspecified type    Stress incontinence in female   Position: Supine  Examination: Incremental scanning of the suprapubic area using 2.0 MHz transducer using copious amounts of acoustic gel.   Findings: An anechoic area was demonstrated which represented the bladder, with measurement of residual urine as noted. I inspected this myself. In that the residual urine was  insignificant, refer to plan for treatment and plan necessary at this time.     Problem List:  Patient Active Problem List   Diagnosis   • Stress incontinence in female       Assessment/Plan   Diagnoses and all orders for this visit:    1. Urinary incontinence, unspecified type (Primary)  -     POC Urinalysis Dipstick, Automated  -     Bladder Scan    2. Stress incontinence in female        Patient with refractory stress urinary incontinence over many years.  Issues only worsening.  Obvious stress urinary continence upon physical examination today.  Discussed with the patient the diagnosis of incontinence with potential treatment options in detail. Ample time was given for questions. We will proceed with plans as outlined below.      The patient is primarily bothered by stress urinary incontinence. Options of observation, Kegel " exercises, bulking agents and a sling were discussed.     Risk and benefits of a sling, including but not limited to, continued incontinence, damage to surrounding structures (such as bladder, bowel, blood vessels, urethra), difficulty voiding or retention, pain, erosion and worsening urgency were discussed. All questions were answered.      Patient with KENY on exam; believe will likely benefit from sling placement; however, aware that any urinary urgency symptoms could be exacerbated by MUS for a time     Notes understanding of the above discussion and wishes to proceed with mid urethral sling placement   schedule OR, cystoscopy and mid urethral sling placement   All questions addressed        Signed:  Bre Buckner MD  12/01/21  11:20 EST      MDM moderate: 1 chronic illness with exacerbation progression; decision regarding minor surgery with identified patient or procedure risk factors

## 2021-12-01 NOTE — H&P (VIEW-ONLY)
UROLOGY OFFICE H&P NOTE    Subjective   HPI  Izabela Shipman is a 55 y.o. female presents for evaluation of urinary incontinence.  Patient reports large amounts of incontinence; cannot control bladder, starts and stops on its own.  Patient wears pads all the time.  Previously tried PF PT without benefit.  Also reports some pelvic pressure.  Leakage occurring for the last several years but has gotten worse over the last 8 months. Can be sitting on the couch and just start leaking. Leaks with intercourse. Walking will just have urine running down leg. [id number] episodically, everyday but not constant leakage. Can't hold it like she used to; some urgency but not the primary issue.   Was seen by Dr. Aquino, last in 2016; tried several meds without improvement; also tried meds through pcp in addition to pelvic floor therapy without improvement.   Denies issues with Annita, only one prior.       Results for orders placed or performed in visit on 12/01/21   Bladder Scan   Result Value Ref Range    Urine Volume 0    POC Urinalysis Dipstick, Automated    Specimen: Urine   Result Value Ref Range    Color Yellow Yellow, Straw, Dark Yellow, Jenna    Clarity, UA Clear Clear    Specific Gravity  1.030 1.005 - 1.030    pH, Urine 5.5 5.0 - 8.0    Leukocytes Negative Negative    Nitrite, UA Negative Negative    Protein, POC Negative Negative mg/dL    Glucose, UA Negative Negative, 1000 mg/dL (3+) mg/dL    Ketones, UA Negative Negative    Urobilinogen, UA Normal Normal    Bilirubin Small (1+) (A) Negative    Blood, UA Negative Negative    Lot Number [id number]     Expiration Date 2,022/11          Medical History:  Past Medical History:   Diagnosis Date   • Acid reflux    • Arthritis    • Dental contusion    • High cholesterol    • Pain    • Sleep disorder    • Thyroid disease    • Yeast infection         Social History:  Social History     Socioeconomic History   • Marital status:    Tobacco Use   • Smoking status: Former  Smoker   • Smokeless tobacco: Never Used   Vaping Use   • Vaping Use: Never used   Substance and Sexual Activity   • Alcohol use: Defer   • Sexual activity: Defer        Family History:  Family History   Problem Relation Age of Onset   • Ulcers Father    • Cancer Mother    • Thyroid disease Mother    • Heart disease Other    • Thyroid disease Other         Surgical History:  Past Surgical History:   Procedure Laterality Date   • COLONOSCOPY     • HYSTERECTOMY     • NECK SURGERY     • ROTATOR CUFF REPAIR          Allergies:  Allergies   Allergen Reactions   • Nsaids Unknown - High Severity   • Pregabalin Unknown - Low Severity     Other reaction(s): Unknown        Current Medications:  Current Outpatient Medications   Medication Sig Dispense Refill   • amoxicillin (AMOXIL) 500 MG capsule TAKE 1 CAPSULE BY MOUTH THREE TIMES A DAY FOR 7 DAYS     • amphetamine-dextroamphetamine (ADDERALL) 10 MG tablet TAKE 1 TABLET BY MOUTH TWO TIMES A DAY AS DIRECTED     • aspirin (aspirin) 81 MG EC tablet aspirin 81 mg tablet,delayed release     • desvenlafaxine (PRISTIQ) 50 MG 24 hr tablet Take 50 mg by mouth Daily.     • Diclofenac Sodium (VOLTAREN) 1 % gel gel APPLY 2 GRAMS TOPICALLY TO AFFECTED AREA FOUR TIMES A DAY     • fluconazole (DIFLUCAN) 150 MG tablet Take 150 mg by mouth As Needed.     • levothyroxine (SYNTHROID, LEVOTHROID) 112 MCG tablet Take 112 mcg by mouth Daily.     • omeprazole (priLOSEC) 40 MG capsule Take 1 capsule by mouth Daily. 30 capsule 12   • oxyCODONE-acetaminophen (PERCOCET)  MG per tablet oxycodone-acetaminophen 10 mg-325 mg tablet   TAKE 1 TABLET BY MOUTH EVERY 8 HOURS AS NEEDED FOR 30 DAYS     • rosuvastatin (CRESTOR) 10 MG tablet Take 10 mg by mouth every night at bedtime.     • tiZANidine (ZANAFLEX) 4 MG tablet TAKE 1 TABLET BY MOUTH EVERY 8 HOURS AS NEEDED FOR 30 DAYS     • zolpidem (Ambien) 5 MG tablet Take one tab as needed for sleep at night of sleep study only. Do not use at home. 2 tablet  "0     No current facility-administered medications for this visit.       Review of systems  Constitutional: Denies fever chills  GI: Denies nausea, vomiting    Objective     Vital Signs:   Resp 14   Ht 154.9 cm (61\")   Wt 65.2 kg (143 lb 12.8 oz)   BMI 27.17 kg/m²       Physical exam  No acute distress, well-nourished  Lungs: Clear, unlabored  CV: Regular rate, no chest retractions  Awake alert and oriented  Mood normal; affect normal  : Normal nothing appearing tissues; no palpable or visible urethral abnormalities; cough with Valsalva; no vaginal prolapse    Bladder Scan interpretation 12/01/2021    Estimation of residual urine via BVI 3000 Verathon Bladder Scan  Residual Urine: 0 ml  Indication: Urinary incontinence, unspecified type    Stress incontinence in female   Position: Supine  Examination: Incremental scanning of the suprapubic area using 2.0 MHz transducer using copious amounts of acoustic gel.   Findings: An anechoic area was demonstrated which represented the bladder, with measurement of residual urine as noted. I inspected this myself. In that the residual urine was  insignificant, refer to plan for treatment and plan necessary at this time.     Problem List:  Patient Active Problem List   Diagnosis   • Stress incontinence in female       Assessment/Plan   Diagnoses and all orders for this visit:    1. Urinary incontinence, unspecified type (Primary)  -     POC Urinalysis Dipstick, Automated  -     Bladder Scan    2. Stress incontinence in female        Patient with refractory stress urinary incontinence over many years.  Issues only worsening.  Obvious stress urinary continence upon physical examination today.  Discussed with the patient the diagnosis of incontinence with potential treatment options in detail. Ample time was given for questions. We will proceed with plans as outlined below.      The patient is primarily bothered by stress urinary incontinence. Options of observation, Kegel " exercises, bulking agents and a sling were discussed.     Risk and benefits of a sling, including but not limited to, continued incontinence, damage to surrounding structures (such as bladder, bowel, blood vessels, urethra), difficulty voiding or retention, pain, erosion and worsening urgency were discussed. All questions were answered.      Patient with KENY on exam; believe will likely benefit from sling placement; however, aware that any urinary urgency symptoms could be exacerbated by MUS for a time     Notes understanding of the above discussion and wishes to proceed with mid urethral sling placement   schedule OR, cystoscopy and mid urethral sling placement   All questions addressed        Signed:  Bre Buckner MD  12/01/21  11:20 EST      MDM moderate: 1 chronic illness with exacerbation progression; decision regarding minor surgery with identified patient or procedure risk factors

## 2021-12-02 ENCOUNTER — PATIENT ROUNDING (BHMG ONLY) (OUTPATIENT)
Dept: UROLOGY | Facility: CLINIC | Age: 56
End: 2021-12-02

## 2021-12-02 NOTE — PROGRESS NOTES
"December 2, 2021    Hello, may I speak with Izabela Shipman?    My name is PARISH Arcos      I am  with [facility]  [address]  [place] [address] [phone].    Before we get started may I verify your date of birth? 1965    I am calling to officially welcome you to our practice and ask about your recent visit. Is this a good time to talk? yes    Tell me about your visit with us. What things went well?  Everything went great. Dr. Buckner was so nice and answered any question the patient had before she even asked it. \"It was like she was reading my mind\". She was thorough. The  staff and person who loaded pt in the room were also very nice. Got called back to the room quick!       We're always looking for ways to make our patients' experiences even better. Do you have recommendations on ways we may improve?  no    Overall were you satisfied with your first visit to our practice? yes       I appreciate you taking the time to speak with me today. Is there anything else I can do for you? no      Thank you, and have a great day.      "

## 2021-12-10 ENCOUNTER — LAB (OUTPATIENT)
Dept: LAB | Facility: HOSPITAL | Age: 56
End: 2021-12-10

## 2021-12-13 ENCOUNTER — ANESTHESIA (OUTPATIENT)
Dept: PERIOP | Facility: HOSPITAL | Age: 56
End: 2021-12-13

## 2021-12-13 ENCOUNTER — ANESTHESIA EVENT (OUTPATIENT)
Dept: PERIOP | Facility: HOSPITAL | Age: 56
End: 2021-12-13

## 2021-12-13 ENCOUNTER — HOSPITAL ENCOUNTER (OUTPATIENT)
Facility: HOSPITAL | Age: 56
Setting detail: HOSPITAL OUTPATIENT SURGERY
Discharge: HOME | End: 2021-12-13
Attending: UROLOGY | Admitting: UROLOGY

## 2021-12-13 VITALS
SYSTOLIC BLOOD PRESSURE: 98 MMHG | WEIGHT: 142.86 LBS | OXYGEN SATURATION: 99 % | HEIGHT: 61 IN | TEMPERATURE: 97.9 F | BODY MASS INDEX: 26.97 KG/M2 | DIASTOLIC BLOOD PRESSURE: 52 MMHG | RESPIRATION RATE: 16 BRPM | HEART RATE: 68 BPM

## 2021-12-13 DIAGNOSIS — N39.3 STRESS INCONTINENCE IN FEMALE: ICD-10-CM

## 2021-12-13 LAB — QT INTERVAL: 395 MS

## 2021-12-13 PROCEDURE — 25010000002 ONDANSETRON PER 1 MG: Performed by: NURSE ANESTHETIST, CERTIFIED REGISTERED

## 2021-12-13 PROCEDURE — 93010 ELECTROCARDIOGRAM REPORT: CPT | Performed by: INTERNAL MEDICINE

## 2021-12-13 PROCEDURE — C1771 REP DEV, URINARY, W/SLING: HCPCS | Performed by: UROLOGY

## 2021-12-13 PROCEDURE — 0 MEPERIDINE PER 100 MG: Performed by: NURSE ANESTHETIST, CERTIFIED REGISTERED

## 2021-12-13 PROCEDURE — 93005 ELECTROCARDIOGRAM TRACING: CPT | Performed by: ANESTHESIOLOGY

## 2021-12-13 PROCEDURE — 25010000002 HYDROMORPHONE 1 MG/ML SOLUTION: Performed by: NURSE ANESTHETIST, CERTIFIED REGISTERED

## 2021-12-13 PROCEDURE — 25010000002 MIDAZOLAM PER 1MG: Performed by: STUDENT IN AN ORGANIZED HEALTH CARE EDUCATION/TRAINING PROGRAM

## 2021-12-13 PROCEDURE — 0 CEFAZOLIN IN DEXTROSE 2-4 GM/100ML-% SOLUTION: Performed by: UROLOGY

## 2021-12-13 PROCEDURE — 25010000002 FENTANYL CITRATE (PF) 50 MCG/ML SOLUTION: Performed by: NURSE ANESTHETIST, CERTIFIED REGISTERED

## 2021-12-13 PROCEDURE — 57288 REPAIR BLADDER DEFECT: CPT | Performed by: UROLOGY

## 2021-12-13 PROCEDURE — 25010000002 DEXAMETHASONE PER 1 MG: Performed by: NURSE ANESTHETIST, CERTIFIED REGISTERED

## 2021-12-13 PROCEDURE — 25010000002 PROPOFOL 10 MG/ML EMULSION: Performed by: NURSE ANESTHETIST, CERTIFIED REGISTERED

## 2021-12-13 DEVICE — TRANSOBTURATOR SLING SYSTEM WITH PRECISIONBLUE™ DESIGN
Type: IMPLANTABLE DEVICE | Site: VAGINA | Status: FUNCTIONAL
Brand: OBTRYX™ II SYSTEM - HALO

## 2021-12-13 RX ORDER — DEXAMETHASONE SODIUM PHOSPHATE 4 MG/ML
INJECTION, SOLUTION INTRA-ARTICULAR; INTRALESIONAL; INTRAMUSCULAR; INTRAVENOUS; SOFT TISSUE AS NEEDED
Status: DISCONTINUED | OUTPATIENT
Start: 2021-12-13 | End: 2021-12-13 | Stop reason: SURG

## 2021-12-13 RX ORDER — MIDAZOLAM HYDROCHLORIDE 2 MG/2ML
2 INJECTION, SOLUTION INTRAMUSCULAR; INTRAVENOUS ONCE
Status: COMPLETED | OUTPATIENT
Start: 2021-12-13 | End: 2021-12-13

## 2021-12-13 RX ORDER — PROPOFOL 10 MG/ML
VIAL (ML) INTRAVENOUS AS NEEDED
Status: DISCONTINUED | OUTPATIENT
Start: 2021-12-13 | End: 2021-12-13 | Stop reason: SURG

## 2021-12-13 RX ORDER — FENTANYL CITRATE 50 UG/ML
INJECTION, SOLUTION INTRAMUSCULAR; INTRAVENOUS AS NEEDED
Status: DISCONTINUED | OUTPATIENT
Start: 2021-12-13 | End: 2021-12-13 | Stop reason: SURG

## 2021-12-13 RX ORDER — ONDANSETRON 4 MG/1
4 TABLET, FILM COATED ORAL ONCE AS NEEDED
Status: DISCONTINUED | OUTPATIENT
Start: 2021-12-13 | End: 2021-12-13 | Stop reason: HOSPADM

## 2021-12-13 RX ORDER — ONDANSETRON 2 MG/ML
4 INJECTION INTRAMUSCULAR; INTRAVENOUS ONCE AS NEEDED
Status: DISCONTINUED | OUTPATIENT
Start: 2021-12-13 | End: 2021-12-13 | Stop reason: HOSPADM

## 2021-12-13 RX ORDER — ACETAMINOPHEN 325 MG/1
650 TABLET ORAL ONCE
Status: DISCONTINUED | OUTPATIENT
Start: 2021-12-13 | End: 2021-12-13 | Stop reason: HOSPADM

## 2021-12-13 RX ORDER — SODIUM CHLORIDE, SODIUM LACTATE, POTASSIUM CHLORIDE, CALCIUM CHLORIDE 600; 310; 30; 20 MG/100ML; MG/100ML; MG/100ML; MG/100ML
9 INJECTION, SOLUTION INTRAVENOUS CONTINUOUS PRN
Status: DISCONTINUED | OUTPATIENT
Start: 2021-12-13 | End: 2021-12-13 | Stop reason: HOSPADM

## 2021-12-13 RX ORDER — ONDANSETRON 2 MG/ML
4 INJECTION INTRAMUSCULAR; INTRAVENOUS ONCE AS NEEDED
Status: DISCONTINUED | OUTPATIENT
Start: 2021-12-13 | End: 2021-12-13 | Stop reason: SDUPTHER

## 2021-12-13 RX ORDER — MAGNESIUM HYDROXIDE 1200 MG/15ML
LIQUID ORAL AS NEEDED
Status: DISCONTINUED | OUTPATIENT
Start: 2021-12-13 | End: 2021-12-13 | Stop reason: HOSPADM

## 2021-12-13 RX ORDER — MEPERIDINE HYDROCHLORIDE 25 MG/ML
12.5 INJECTION INTRAMUSCULAR; INTRAVENOUS; SUBCUTANEOUS
Status: DISCONTINUED | OUTPATIENT
Start: 2021-12-13 | End: 2021-12-13 | Stop reason: SDUPTHER

## 2021-12-13 RX ORDER — PROMETHAZINE HYDROCHLORIDE 25 MG/1
25 SUPPOSITORY RECTAL ONCE AS NEEDED
Status: DISCONTINUED | OUTPATIENT
Start: 2021-12-13 | End: 2021-12-13 | Stop reason: SDUPTHER

## 2021-12-13 RX ORDER — PROMETHAZINE HYDROCHLORIDE 12.5 MG/1
25 TABLET ORAL ONCE AS NEEDED
Status: DISCONTINUED | OUTPATIENT
Start: 2021-12-13 | End: 2021-12-13 | Stop reason: HOSPADM

## 2021-12-13 RX ORDER — ROCURONIUM BROMIDE 10 MG/ML
INJECTION, SOLUTION INTRAVENOUS AS NEEDED
Status: DISCONTINUED | OUTPATIENT
Start: 2021-12-13 | End: 2021-12-13 | Stop reason: SURG

## 2021-12-13 RX ORDER — ACETAMINOPHEN 500 MG
1000 TABLET ORAL ONCE
Status: COMPLETED | OUTPATIENT
Start: 2021-12-13 | End: 2021-12-13

## 2021-12-13 RX ORDER — MEPERIDINE HYDROCHLORIDE 25 MG/ML
12.5 INJECTION INTRAMUSCULAR; INTRAVENOUS; SUBCUTANEOUS
Status: DISCONTINUED | OUTPATIENT
Start: 2021-12-13 | End: 2021-12-13 | Stop reason: HOSPADM

## 2021-12-13 RX ORDER — ASPIRIN 81 MG/1
81 TABLET ORAL DAILY
Start: 2021-12-16 | End: 2023-03-17 | Stop reason: HOSPADM

## 2021-12-13 RX ORDER — CEFAZOLIN SODIUM 2 G/100ML
2 INJECTION, SOLUTION INTRAVENOUS ONCE
Status: COMPLETED | OUTPATIENT
Start: 2021-12-13 | End: 2021-12-13

## 2021-12-13 RX ORDER — OXYCODONE HYDROCHLORIDE 5 MG/1
5-10 TABLET ORAL EVERY 6 HOURS PRN
Qty: 20 TABLET | Refills: 0 | Status: SHIPPED | OUTPATIENT
Start: 2021-12-13 | End: 2023-03-17 | Stop reason: HOSPADM

## 2021-12-13 RX ORDER — BUPIVACAINE HYDROCHLORIDE AND EPINEPHRINE 2.5; 5 MG/ML; UG/ML
INJECTION, SOLUTION EPIDURAL; INFILTRATION; INTRACAUDAL; PERINEURAL AS NEEDED
Status: DISCONTINUED | OUTPATIENT
Start: 2021-12-13 | End: 2021-12-13 | Stop reason: HOSPADM

## 2021-12-13 RX ORDER — IBUPROFEN 600 MG/1
600 TABLET ORAL EVERY 6 HOURS PRN
Status: DISCONTINUED | OUTPATIENT
Start: 2021-12-13 | End: 2021-12-13 | Stop reason: HOSPADM

## 2021-12-13 RX ORDER — LIDOCAINE HYDROCHLORIDE 20 MG/ML
INJECTION, SOLUTION INFILTRATION; PERINEURAL AS NEEDED
Status: DISCONTINUED | OUTPATIENT
Start: 2021-12-13 | End: 2021-12-13 | Stop reason: SURG

## 2021-12-13 RX ORDER — PROMETHAZINE HYDROCHLORIDE 12.5 MG/1
12.5 TABLET ORAL ONCE AS NEEDED
Status: DISCONTINUED | OUTPATIENT
Start: 2021-12-13 | End: 2021-12-13 | Stop reason: HOSPADM

## 2021-12-13 RX ORDER — GLYCOPYRROLATE 0.2 MG/ML
0.2 INJECTION INTRAMUSCULAR; INTRAVENOUS
Status: COMPLETED | OUTPATIENT
Start: 2021-12-13 | End: 2021-12-13

## 2021-12-13 RX ORDER — OXYCODONE HYDROCHLORIDE 5 MG/1
5 TABLET ORAL
Status: DISCONTINUED | OUTPATIENT
Start: 2021-12-13 | End: 2021-12-13 | Stop reason: HOSPADM

## 2021-12-13 RX ORDER — OXYCODONE HYDROCHLORIDE 5 MG/1
5 TABLET ORAL
Status: DISCONTINUED | OUTPATIENT
Start: 2021-12-13 | End: 2021-12-13 | Stop reason: SDUPTHER

## 2021-12-13 RX ORDER — PROMETHAZINE HYDROCHLORIDE 12.5 MG/1
25 TABLET ORAL ONCE AS NEEDED
Status: DISCONTINUED | OUTPATIENT
Start: 2021-12-13 | End: 2021-12-13 | Stop reason: SDUPTHER

## 2021-12-13 RX ORDER — PROMETHAZINE HYDROCHLORIDE 25 MG/1
25 SUPPOSITORY RECTAL ONCE AS NEEDED
Status: DISCONTINUED | OUTPATIENT
Start: 2021-12-13 | End: 2021-12-13 | Stop reason: HOSPADM

## 2021-12-13 RX ORDER — ONDANSETRON 2 MG/ML
INJECTION INTRAMUSCULAR; INTRAVENOUS AS NEEDED
Status: DISCONTINUED | OUTPATIENT
Start: 2021-12-13 | End: 2021-12-13 | Stop reason: SURG

## 2021-12-13 RX ADMIN — SODIUM CHLORIDE, POTASSIUM CHLORIDE, SODIUM LACTATE AND CALCIUM CHLORIDE 9 ML/HR: 600; 310; 30; 20 INJECTION, SOLUTION INTRAVENOUS at 13:36

## 2021-12-13 RX ADMIN — MIDAZOLAM HYDROCHLORIDE 2 MG: 1 INJECTION, SOLUTION INTRAMUSCULAR; INTRAVENOUS at 11:23

## 2021-12-13 RX ADMIN — HYDROMORPHONE HYDROCHLORIDE 0.5 MG: 1 INJECTION, SOLUTION INTRAMUSCULAR; INTRAVENOUS; SUBCUTANEOUS at 13:05

## 2021-12-13 RX ADMIN — CEFAZOLIN SODIUM 2 G: 2 INJECTION, SOLUTION INTRAVENOUS at 11:42

## 2021-12-13 RX ADMIN — ONDANSETRON 4 MG: 2 INJECTION INTRAMUSCULAR; INTRAVENOUS at 11:56

## 2021-12-13 RX ADMIN — HYDROMORPHONE HYDROCHLORIDE 0.5 MG: 1 INJECTION, SOLUTION INTRAMUSCULAR; INTRAVENOUS; SUBCUTANEOUS at 13:36

## 2021-12-13 RX ADMIN — DEXAMETHASONE SODIUM PHOSPHATE 4 MG: 4 INJECTION INTRA-ARTICULAR; INTRALESIONAL; INTRAMUSCULAR; INTRAVENOUS; SOFT TISSUE at 11:56

## 2021-12-13 RX ADMIN — ACETAMINOPHEN 1000 MG: 500 TABLET ORAL at 09:57

## 2021-12-13 RX ADMIN — SODIUM CHLORIDE, POTASSIUM CHLORIDE, SODIUM LACTATE AND CALCIUM CHLORIDE: 600; 310; 30; 20 INJECTION, SOLUTION INTRAVENOUS at 12:27

## 2021-12-13 RX ADMIN — SUGAMMADEX 200 MG: 100 INJECTION, SOLUTION INTRAVENOUS at 12:46

## 2021-12-13 RX ADMIN — ROCURONIUM BROMIDE 50 MG: 10 INJECTION INTRAVENOUS at 11:44

## 2021-12-13 RX ADMIN — PROPOFOL 200 MG: 10 INJECTION, EMULSION INTRAVENOUS at 11:44

## 2021-12-13 RX ADMIN — FENTANYL CITRATE 100 MCG: 50 INJECTION, SOLUTION INTRAMUSCULAR; INTRAVENOUS at 11:44

## 2021-12-13 RX ADMIN — MEPERIDINE HYDROCHLORIDE 12.5 MG: 25 INJECTION INTRAMUSCULAR; INTRAVENOUS; SUBCUTANEOUS at 13:03

## 2021-12-13 RX ADMIN — GLYCOPYRROLATE 0.2 MG: 0.2 INJECTION INTRAMUSCULAR; INTRAVENOUS at 11:23

## 2021-12-13 RX ADMIN — SODIUM CHLORIDE, POTASSIUM CHLORIDE, SODIUM LACTATE AND CALCIUM CHLORIDE 9 ML/HR: 600; 310; 30; 20 INJECTION, SOLUTION INTRAVENOUS at 09:57

## 2021-12-13 RX ADMIN — LIDOCAINE HYDROCHLORIDE 60 MG: 20 INJECTION, SOLUTION INFILTRATION; PERINEURAL at 11:44

## 2021-12-13 NOTE — ANESTHESIA POSTPROCEDURE EVALUATION
Patient: Izabela Shipman    Procedure Summary     Date: 12/13/21 Room / Location: [facility] OR 01 / [facility] MAIN OR    Anesthesia Start: 1137 Anesthesia Stop: 1256    Procedure: MID-URETHRAL SLING WITH CYSTOSCOPY (N/A Vagina) Diagnosis:       Stress incontinence in female      (Stress incontinence in female [N39.3])    Surgeons: Bre Buckner MD Provider: Malcom Mittal MD    Anesthesia Type: general ASA Status: 3          Anesthesia Type: general    Vitals  Vitals Value Taken Time   /63 12/13/21 1319   Temp 36.3 °C (97.3 °F) 12/13/21 1254   Pulse 79 12/13/21 1320   Resp 14 12/13/21 1254   SpO2 92 % 12/13/21 1320   Vitals shown include unvalidated device data.        Post Anesthesia Care and Evaluation    Patient location during evaluation: bedside  Patient participation: complete - patient participated  Level of consciousness: awake  Pain management: adequate  Airway patency: patent  Anesthetic complications: No anesthetic complications  PONV Status: none  Cardiovascular status: acceptable  Respiratory status: acceptable  Hydration status: acceptable    Comments: An Anesthesiologist personally participated in the most demanding procedures (including induction and emergence if applicable) in the anesthesia plan, monitored the course of anesthesia administration at frequent intervals and remained physically present and available for immediate diagnosis and treatment of emergencies.

## 2021-12-13 NOTE — OP NOTE
Preoperative diagnosis  Stress urinary incontinence     Postoperative diagnosis  Same as above    Procedure performed  Cystoscopy, mid urethral sling placement    Attending surgeon  Bre Buckner MD     Anesthesia  General    EBL  10 mL    Complications  None    Specimen  None    Findings  Patent urethra, cystoscopy with abnormality    Indications  55 y.o. female agreed to undergo the above named procedure after discussion of the alternatives, risks and benefits.   Informed consent was obtained.      Procedure  After induction of general anesthesia, the patient was prepped and draped in lithotomy position standard fashion.  A Antunez catheter was placed and the bladder was drained.  The extent of the urethra was then identified by gentle traction on the catheter and palpation of the Antunez balloon.  The suburethral vaginal mucosa was then heavily injected using Marcaine with epinephrine.  A knife was then utilized to create a 1-1/2 centimeter mid urethral incision.  Metzenbaum scissors were utilized to elevate the vaginal mucosa off of the underlying urethra.  Stab incisions made bilaterally at thigh crease after palpation for position. A Obtryx Halo sling system was then utilized to anchor the sling into the endopelvic fascia on both sides.  The trocar remained intact with the mesh on the patient's right and left side.  The catheter was then removed and the bladder was inspected by cystoscopy both 30 and 70 degree lens.  There is no evidence for tumor, stone, or foreign body.  No evidence of trochar injury to the bladder or urethra. The bladder was filled to approximately 300 mL of irrigation.  The scope was then withdrawn. The mesh was then adjusted to ensure that there was no tension and the sleeves withdrawn simultaneously ensuring that it remained midline. Final inspection revealed it to be in good position. The wings were cut bilaterally at the skin level and puncture sites reapproximated with dermabode.  The  vaginal incision was then reapproximated using running absorbable suture.  All instrument, needle, and sponge counts are correct prior to closure.  The wound was heavily irrigated using antibiotic solution.  Premarin soaked vaginal pack was placed. She tolerated procedure well, was awakened and transported to postanesthesia care unit in stable condition.         Signed:  Bre Buckner MD  12/13/21  12:56 EST

## 2021-12-13 NOTE — ANESTHESIA PREPROCEDURE EVALUATION
Anesthesia Evaluation     Patient summary reviewed and Nursing notes reviewed   no history of anesthetic complications:  NPO Solid Status: > 8 hours  NPO Liquid Status: > 2 hours           Airway   Mallampati: II  TM distance: >3 FB  Neck ROM: full  No difficulty expected  Dental    (+) upper dentures    Pulmonary - normal exam   (+) a smoker Current,   Cardiovascular - normal exam  Exercise tolerance: good (4-7 METS)    ECG reviewed    (+) hyperlipidemia,       Neuro/Psych- negative ROS  GI/Hepatic/Renal/Endo    (+)  GERD, PUD,  thyroid problem (hashimotos)     Musculoskeletal     (+) neck pain (s/p cervical spinal fusion),   Abdominal  - normal exam   Substance History - negative use     OB/GYN negative ob/gyn ROS         Other   arthritis,      ROS/Med Hx Other: Dental contusion                Anesthesia Plan    ASA 3     general   (Patient understands anesthesia not responsible for dental damage.)  intravenous induction     Anesthetic plan, all risks, benefits, and alternatives have been provided, discussed and informed consent has been obtained with: patient.    Plan discussed with CRNA.

## 2021-12-22 ENCOUNTER — HOSPITAL ENCOUNTER (OUTPATIENT)
Dept: SLEEP MEDICINE | Facility: HOSPITAL | Age: 56
End: 2021-12-22

## 2021-12-28 PROBLEM — M54.30 SCIATICA: Status: ACTIVE | Noted: 2021-12-28

## 2021-12-28 PROBLEM — Z79.4 ENCOUNTER FOR LONG-TERM (CURRENT) USE OF INSULIN: Status: ACTIVE | Noted: 2018-12-04

## 2021-12-28 PROBLEM — E05.90 HYPERTHYROIDISM: Status: ACTIVE | Noted: 2021-12-28

## 2021-12-28 PROBLEM — Z72.0 TOBACCO USER: Status: ACTIVE | Noted: 2021-12-28

## 2021-12-28 PROBLEM — Z85.828 HISTORY OF MALIGNANT NEOPLASM OF SKIN: Status: ACTIVE | Noted: 2021-12-28

## 2021-12-28 PROBLEM — R32 INCONTINENCE: Status: ACTIVE | Noted: 2021-12-28

## 2021-12-28 PROBLEM — M79.89 LIMB SWELLING: Status: ACTIVE | Noted: 2021-12-28

## 2021-12-28 PROBLEM — Z90.710 ACQUIRED ABSENCE OF BOTH CERVIX AND UTERUS: Status: ACTIVE | Noted: 2021-12-28

## 2021-12-28 PROBLEM — K21.9 GASTROESOPHAGEAL REFLUX DISEASE WITHOUT ESOPHAGITIS: Status: ACTIVE | Noted: 2021-12-28

## 2021-12-28 PROBLEM — E55.9 VITAMIN D DEFICIENCY: Status: ACTIVE | Noted: 2021-12-28

## 2021-12-28 PROBLEM — IMO0002 ULCERATIVE LESION: Status: ACTIVE | Noted: 2021-12-28

## 2021-12-28 PROBLEM — M19.90 ARTHRITIS: Status: ACTIVE | Noted: 2021-12-28

## 2021-12-28 PROBLEM — M47.812 CERVICAL SPONDYLOSIS: Status: ACTIVE | Noted: 2020-03-12

## 2021-12-28 PROBLEM — M19.049 LOCALIZED, PRIMARY OSTEOARTHRITIS OF HAND: Status: ACTIVE | Noted: 2021-12-28

## 2021-12-28 PROBLEM — R13.10 DYSPHAGIA: Status: ACTIVE | Noted: 2021-12-06

## 2021-12-28 PROBLEM — N32.9 BLADDER DISORDER: Status: ACTIVE | Noted: 2021-12-28

## 2021-12-28 PROBLEM — E78.2 MIXED HYPERLIPIDEMIA: Status: ACTIVE | Noted: 2021-12-28

## 2021-12-28 PROBLEM — E06.3 HASHIMOTO'S THYROIDITIS: Status: ACTIVE | Noted: 2021-12-06

## 2021-12-28 PROBLEM — G93.32 CHRONIC FATIGUE SYNDROME: Status: ACTIVE | Noted: 2021-12-28

## 2021-12-28 PROBLEM — J30.2 SEASONAL ALLERGIES: Status: ACTIVE | Noted: 2021-12-28

## 2021-12-28 PROBLEM — Z78.0 MENOPAUSE: Status: ACTIVE | Noted: 2021-12-28

## 2021-12-28 PROBLEM — M25.519 SHOULDER PAIN: Status: ACTIVE | Noted: 2021-06-28

## 2021-12-28 PROBLEM — F51.01 PRIMARY INSOMNIA: Status: ACTIVE | Noted: 2021-12-28

## 2021-12-28 PROBLEM — F41.9 ANXIETY: Status: ACTIVE | Noted: 2021-12-28

## 2021-12-28 RX ORDER — BACLOFEN 10 MG/1
TABLET ORAL EVERY 8 HOURS SCHEDULED
COMMUNITY
End: 2023-03-17 | Stop reason: HOSPADM

## 2021-12-28 RX ORDER — PHENTERMINE HYDROCHLORIDE 30 MG/1
CAPSULE ORAL
COMMUNITY
End: 2023-03-17 | Stop reason: HOSPADM

## 2021-12-28 RX ORDER — VENLAFAXINE HYDROCHLORIDE 150 MG/1
TABLET, EXTENDED RELEASE ORAL EVERY 24 HOURS
COMMUNITY
End: 2023-03-17 | Stop reason: HOSPADM

## 2021-12-28 RX ORDER — LEVOTHYROXINE SODIUM 100 UG/1
100 TABLET ORAL DAILY
COMMUNITY
Start: 2021-12-10

## 2021-12-28 RX ORDER — VORTIOXETINE 5 MG/1
TABLET, FILM COATED ORAL EVERY 24 HOURS
COMMUNITY
End: 2023-03-17 | Stop reason: HOSPADM

## 2021-12-28 RX ORDER — FENOFIBRATE 145 MG/1
TABLET, COATED ORAL EVERY 24 HOURS
COMMUNITY
Start: 2021-07-16 | End: 2023-03-17 | Stop reason: HOSPADM

## 2021-12-28 RX ORDER — BUSPIRONE HYDROCHLORIDE 30 MG/1
TABLET ORAL EVERY 12 HOURS SCHEDULED
COMMUNITY
End: 2023-03-17 | Stop reason: HOSPADM

## 2021-12-28 RX ORDER — QUETIAPINE FUMARATE 50 MG/1
TABLET, FILM COATED ORAL
COMMUNITY
End: 2023-03-17 | Stop reason: HOSPADM

## 2021-12-28 RX ORDER — LEVOTHYROXINE AND LIOTHYRONINE 38; 9 UG/1; UG/1
TABLET ORAL EVERY 24 HOURS
COMMUNITY
Start: 2021-10-06 | End: 2023-03-17 | Stop reason: HOSPADM

## 2021-12-28 RX ORDER — OXCARBAZEPINE 300 MG/1
TABLET, FILM COATED ORAL
COMMUNITY
Start: 2021-12-09 | End: 2023-03-17 | Stop reason: HOSPADM

## 2021-12-28 RX ORDER — DIAZEPAM 5 MG/1
TABLET ORAL
COMMUNITY
End: 2023-03-17 | Stop reason: HOSPADM

## 2021-12-28 RX ORDER — OXYCODONE HYDROCHLORIDE 10 MG/1
TABLET ORAL EVERY 8 HOURS SCHEDULED
COMMUNITY
End: 2023-03-17 | Stop reason: HOSPADM

## 2021-12-30 ENCOUNTER — OFFICE VISIT (OUTPATIENT)
Dept: UROLOGY | Facility: CLINIC | Age: 56
End: 2021-12-30

## 2021-12-30 VITALS — BODY MASS INDEX: 26.85 KG/M2 | HEIGHT: 61 IN | RESPIRATION RATE: 16 BRPM | WEIGHT: 142.2 LBS

## 2021-12-30 DIAGNOSIS — B37.9 CANDIDIASIS: ICD-10-CM

## 2021-12-30 DIAGNOSIS — N39.3 STRESS INCONTINENCE IN FEMALE: Primary | ICD-10-CM

## 2021-12-30 LAB — URINE VOLUME: 1

## 2021-12-30 PROCEDURE — 51798 US URINE CAPACITY MEASURE: CPT | Performed by: UROLOGY

## 2021-12-30 PROCEDURE — 99024 POSTOP FOLLOW-UP VISIT: CPT | Performed by: UROLOGY

## 2021-12-30 RX ORDER — PREDNISONE 10 MG/1
TABLET ORAL
COMMUNITY
Start: 2021-12-29

## 2021-12-30 RX ORDER — AMOXICILLIN 500 MG/1
CAPSULE ORAL
COMMUNITY
Start: 2021-12-28 | End: 2023-03-17 | Stop reason: HOSPADM

## 2021-12-30 RX ORDER — FLUCONAZOLE 150 MG/1
150 TABLET ORAL ONCE
Qty: 2 TABLET | Refills: 0 | Status: SHIPPED | OUTPATIENT
Start: 2021-12-30 | End: 2021-12-30

## 2021-12-30 NOTE — PROGRESS NOTES
"    UROLOGY OFFICE FOLLOW-UP NOTE    Subjective   HPI  Izabela Shipman is a 56 y.o. female presents for evaluation of urinary incontinence.  Patient reports large amounts of incontinence; cannot control bladder, starts and stops on its own.  Patient wears pads all the time.  Previously tried PF PT without benefit.  Also reports some pelvic pressure.  Leakage occurring for the last several years but has gotten worse over the last 8 months. Can be sitting on the couch and just start leaking. Leaks with intercourse. Walking will just have urine running down leg. [id number] episodically, everyday but not constant leakage. Can't hold it like she used to; some urgency but not the primary issue.   Was seen by Dr. Aquino, last in 2016; tried several meds without improvement; also tried meds through pcp in addition to pelvic floor therapy without improvement.   Denies issues with Annita, only one prior.     Update 12/30/2021: Patient presents for postop follow-up after mid urethral sling placement, 12/13/2021.  States she is doing well from a stress urine incontinence standpoint.  Concerned that she feels something \"poking\" her.  Overall doing very well; no significant pain.  Has been on antibiotics due to oral, teeth issues over the last 6 weeks.  Feels like she has a yeast infection.  Generally very pleased with the result after her sling.      Results for orders placed or performed in visit on 12/30/21   Bladder Scan   Result Value Ref Range    Urine Volume 1          Medical History:  Past Medical History:   Diagnosis Date   • Acid reflux    • Arthritis    • Decreased ROM of intervertebral discs of cervical spine     due to neck surgery   • Dental contusion    • High cholesterol    • Multiple gastric ulcers    • Sleep disorder    • Stress incontinence    • Thyroid disease         Social History:  Social History     Socioeconomic History   • Marital status:    Tobacco Use   • Smoking status: Current Some Day Smoker    "  Packs/day: 0.25     Types: Cigarettes   • Smokeless tobacco: Never Used   • Tobacco comment: trying to quit down to 5 cig a day , hasnt smoked in 24 hours   Vaping Use   • Vaping Use: Never used   Substance and Sexual Activity   • Alcohol use: Defer   • Drug use: Never   • Sexual activity: Defer        Family History:  Family History   Problem Relation Age of Onset   • Ulcers Father    • Cancer Mother    • Thyroid disease Mother    • Heart disease Other    • Thyroid disease Other         Surgical History:  Past Surgical History:   Procedure Laterality Date   • ABDOMINAL SURGERY     • COLONOSCOPY     • EXPLORATORY LAPAROTOMY     • HYSTERECTOMY     • MOUTH SURGERY     • NECK SURGERY     • PUBOVAGINAL SLING N/A 12/13/2021    Procedure: MID-URETHRAL SLING WITH CYSTOSCOPY;  Surgeon: Bre Buckner MD;  Location: [facility] OR;  Service: Urology;  Laterality: N/A;   • ROTATOR CUFF REPAIR Right         Allergies:  Allergies   Allergen Reactions   • Nsaids Unknown - High Severity     Has ulcers    • Pregabalin Unknown - Low Severity     Other reaction(s): Unknown        Current Medications:  Current Outpatient Medications   Medication Sig Dispense Refill   • fenofibrate (TRICOR) 145 MG tablet Daily.     • Thyroid (ARMOUR THYROID) 60 MG PO tablet Daily.     • amoxicillin (AMOXIL) 500 MG capsule TAKE 4 CAPSULES BY MOUTH 1 HOUR PRIOR TO SURGERY THEN THREE TIMES A DAY UNTIL FINISHED     • amphetamine-dextroamphetamine (ADDERALL) 10 MG tablet TAKE 1 TABLET BY MOUTH TWO TIMES A DAY AS DIRECTED     • aspirin (aspirin) 81 MG EC tablet Take 1 tablet by mouth Daily. Patient to stop 3 days prior to surgeyr.     • baclofen (LIORESAL) 10 MG tablet Every 8 (Eight) Hours.     • BUPROPION HCL PO 1 tab(s)     • busPIRone (BUSPAR) 30 MG tablet Every 12 (Twelve) Hours.     • desvenlafaxine (PRISTIQ) 50 MG 24 hr tablet Take 50 mg by mouth Daily.     • diazePAM (VALIUM) 5 MG tablet 1 tab(s)     • Diclofenac Sodium (VOLTAREN) 1 % gel gel 4  "g Daily As Needed.     • fluconazole (Diflucan) 150 MG tablet Take 1 tablet by mouth 1 (One) Time for 1 dose. May take additional dose if symptoms persist after 72 hours. 2 tablet 0   • levothyroxine (SYNTHROID, LEVOTHROID) 112 MCG tablet Take 112 mcg by mouth Daily.     • Levoxyl 100 MCG tablet Take 100 mcg by mouth Daily.     • MULTIPLE VITAMINS-MINERALS ER PO Daily.     • omeprazole (priLOSEC) 40 MG capsule Take 1 capsule by mouth Daily. 30 capsule 12   • OXcarbazepine (TRILEPTAL) 300 MG tablet TAKE 1 TABLET BY MOUTH TWO TIMES A DAY FOR MOOD     • oxyCODONE (ROXICODONE) 10 MG tablet Every 8 (Eight) Hours.     • oxyCODONE (Roxicodone) 5 MG immediate release tablet Take 1-2 tablets by mouth Every 6 (Six) Hours As Needed for Severe Pain . 20 tablet 0   • oxyCODONE-acetaminophen (PERCOCET)  MG per tablet oxycodone-acetaminophen 10 mg-325 mg tablet   TAKE 1 TABLET BY MOUTH EVERY 8 HOURS AS NEEDED FOR 30 DAYS     • phentermine 30 MG capsule 1 cap(s)     • predniSONE (DELTASONE) 10 MG tablet      • QUEtiapine (SEROquel) 50 MG tablet 1 tab     • rosuvastatin (CRESTOR) 10 MG tablet Take 10 mg by mouth every night at bedtime.     • tiZANidine (ZANAFLEX) 4 MG tablet TAKE 1 TABLET BY MOUTH EVERY 8 HOURS AS NEEDED FOR 30 DAYS     • venlafaxine (EFFEXOR) 150 MG tablet sustained-release 24 hour 24 hr tablet Daily.     • Vortioxetine HBr (Trintellix) 5 MG tablet Daily.     • zolpidem (Ambien) 5 MG tablet Take one tab as needed for sleep at night of sleep study only. Do not use at home. 2 tablet 0     No current facility-administered medications for this visit.       Review of systems  Constitutional: Denies fever chills  GI: Denies nausea, vomiting    Objective     Vital Signs:   Resp 16   Ht 154.9 cm (61\")   Wt 64.5 kg (142 lb 3.2 oz)   BMI 26.87 kg/m²       Physical exam  No acute distress, well-nourished  Lungs: Clear, unlabored  CV: Regular rate, no chest retractions  Awake alert and oriented  Mood normal; affect " normal  : Thigh punctures healing very well; vaginal incision intact with Vicryl stitches visible; no palpable mesh; tissues appear healthy; some white discharge consistent with candidiasis    Bladder Scan interpretation 12/01/2021    Estimation of residual urine via BVI 3000 Verathon Bladder Scan  Residual Urine: 1 ml  Indication: Stress incontinence in female    Candidiasis   Position: Supine  Examination: Incremental scanning of the suprapubic area using 2.0 MHz transducer using copious amounts of acoustic gel.   Findings: An anechoic area was demonstrated which represented the bladder, with measurement of residual urine as noted. I inspected this myself. In that the residual urine was  insignificant, refer to plan for treatment and plan necessary at this time.     Problem List:  Patient Active Problem List   Diagnosis   • Stress incontinence in female   • Acquired absence of both cervix and uterus   • Hyperthyroidism   • Anxiety   • Arthritis   • Bladder disorder   • Cervical spondylosis   • Chronic fatigue syndrome   • Dysphagia   • Encounter for long-term (current) use of insulin (HCC)   • Gastroesophageal reflux disease without esophagitis   • Hashimoto's thyroiditis   • History of malignant neoplasm of skin   • Incontinence   • Limb swelling   • Localized, primary osteoarthritis of hand   • Menopause   • Mixed hyperlipidemia   • Primary insomnia   • Sciatica   • Seasonal allergies   • Shoulder pain   • Tobacco user   • Ulcerative lesion   • Vitamin D deficiency   • Urge incontinence       Assessment/Plan   Diagnoses and all orders for this visit:    1. Stress incontinence in female (Primary)  -     Bladder Scan    2. Candidiasis  -     fluconazole (Diflucan) 150 MG tablet; Take 1 tablet by mouth 1 (One) Time for 1 dose. May take additional dose if symptoms persist after 72 hours.  Dispense: 2 tablet; Refill: 0      Doing very well from a sling standpoint; encourage restricted activities including sexual  activities for several more weeks until stitches dissolve.  Okay to take a bath in a week.  Adequately emptying bladder without postvoid residual after sling placement.    Physical exam findings as well as history consistent with candidiasis  Fluconazole sent to pharmacy    Patient encouraged at this point to follow-up with us as needed  All questions addressed     Signed:  Bre Buckner MD  12/30/21  10:15 EST

## 2022-01-14 ENCOUNTER — LAB (OUTPATIENT)
Dept: LAB | Facility: HOSPITAL | Age: 57
End: 2022-01-14

## 2022-01-14 DIAGNOSIS — E66.3 OVERWEIGHT (BMI 25.0-29.9): ICD-10-CM

## 2022-01-14 DIAGNOSIS — G47.10 HYPERSOMNIA: ICD-10-CM

## 2022-01-14 DIAGNOSIS — G47.8 NON-RESTORATIVE SLEEP: ICD-10-CM

## 2022-01-14 DIAGNOSIS — G47.00 INSOMNIA, UNSPECIFIED TYPE: ICD-10-CM

## 2022-01-14 DIAGNOSIS — G47.63 SLEEP-RELATED BRUXISM: ICD-10-CM

## 2022-02-16 ENCOUNTER — TRANSCRIBE ORDERS (OUTPATIENT)
Dept: ADMINISTRATIVE | Facility: HOSPITAL | Age: 57
End: 2022-02-16

## 2022-02-16 DIAGNOSIS — E03.9 HYPOTHYROIDISM, UNSPECIFIED TYPE: Primary | ICD-10-CM

## 2022-03-16 ENCOUNTER — HOSPITAL ENCOUNTER (OUTPATIENT)
Dept: ULTRASOUND IMAGING | Facility: HOSPITAL | Age: 57
Discharge: HOME | End: 2022-03-16
Admitting: INTERNAL MEDICINE

## 2022-03-16 DIAGNOSIS — E03.9 HYPOTHYROIDISM, UNSPECIFIED TYPE: ICD-10-CM

## 2022-03-16 PROCEDURE — 76536 US EXAM OF HEAD AND NECK: CPT

## 2022-08-24 ENCOUNTER — TRANSCRIBE ORDERS (OUTPATIENT)
Dept: ADMINISTRATIVE | Facility: HOSPITAL | Age: 57
End: 2022-08-24

## 2022-08-24 DIAGNOSIS — Z12.31 SCREENING MAMMOGRAM, ENCOUNTER FOR: Primary | ICD-10-CM

## 2022-08-31 ENCOUNTER — APPOINTMENT (OUTPATIENT)
Dept: MAMMOGRAPHY | Facility: HOSPITAL | Age: 57
End: 2022-08-31

## 2022-09-02 ENCOUNTER — APPOINTMENT (OUTPATIENT)
Dept: MAMMOGRAPHY | Facility: HOSPITAL | Age: 57
End: 2022-09-02

## 2022-09-07 ENCOUNTER — HOSPITAL ENCOUNTER (OUTPATIENT)
Dept: MAMMOGRAPHY | Facility: HOSPITAL | Age: 57
Discharge: HOME | End: 2022-09-07
Admitting: NURSE PRACTITIONER

## 2022-09-07 DIAGNOSIS — Z12.31 SCREENING MAMMOGRAM, ENCOUNTER FOR: ICD-10-CM

## 2022-09-07 PROCEDURE — 77063 BREAST TOMOSYNTHESIS BI: CPT

## 2022-09-07 PROCEDURE — 77067 SCR MAMMO BI INCL CAD: CPT

## 2022-09-14 ENCOUNTER — TRANSCRIBE ORDERS (OUTPATIENT)
Dept: LAB | Facility: HOSPITAL | Age: 57
End: 2022-09-14

## 2022-09-14 ENCOUNTER — LAB (OUTPATIENT)
Dept: LAB | Facility: HOSPITAL | Age: 57
End: 2022-09-14

## 2022-09-14 DIAGNOSIS — R40.0 SLEEPINESS: ICD-10-CM

## 2022-09-14 DIAGNOSIS — F51.05 INSOMNIA RELATED TO ANOTHER MENTAL DISORDER: ICD-10-CM

## 2022-09-14 DIAGNOSIS — F90.0 ADHD, PREDOMINANTLY INATTENTIVE TYPE: ICD-10-CM

## 2022-09-14 DIAGNOSIS — F51.05 INSOMNIA RELATED TO ANOTHER MENTAL DISORDER: Primary | ICD-10-CM

## 2022-09-14 DIAGNOSIS — F33.2 SEVERE RECURRENT MAJOR DEPRESSION WITHOUT PSYCHOTIC FEATURES: ICD-10-CM

## 2022-09-14 DIAGNOSIS — F41.1 GENERALIZED ANXIETY DISORDER: ICD-10-CM

## 2022-09-14 LAB
AMPHET+METHAMPHET UR QL: POSITIVE
BARBITURATES UR QL SCN: NEGATIVE
BENZODIAZ UR QL SCN: NEGATIVE
CANNABINOIDS SERPL QL: NEGATIVE
COCAINE UR QL: NEGATIVE
METHADONE UR QL SCN: NEGATIVE
OPIATES UR QL: NEGATIVE
OXYCODONE UR QL SCN: POSITIVE

## 2022-09-14 PROCEDURE — 80307 DRUG TEST PRSMV CHEM ANLYZR: CPT

## 2022-09-22 RX ORDER — LEVOTHYROXINE SODIUM 100 UG/1
TABLET ORAL
Qty: 30 TABLET | Refills: 0 | OUTPATIENT
Start: 2022-09-22

## 2022-09-28 ENCOUNTER — TELEPHONE (OUTPATIENT)
Dept: SLEEP MEDICINE | Facility: HOSPITAL | Age: 57
End: 2022-09-28

## 2022-09-29 ENCOUNTER — OFFICE VISIT (OUTPATIENT)
Dept: SLEEP MEDICINE | Facility: HOSPITAL | Age: 57
End: 2022-09-29

## 2022-09-29 VITALS — BODY MASS INDEX: 28.53 KG/M2 | HEIGHT: 61 IN | OXYGEN SATURATION: 97 % | WEIGHT: 151.1 LBS | HEART RATE: 90 BPM

## 2022-09-29 DIAGNOSIS — G47.8 NON-RESTORATIVE SLEEP: ICD-10-CM

## 2022-09-29 DIAGNOSIS — G47.10 HYPERSOMNIA: Primary | ICD-10-CM

## 2022-09-29 DIAGNOSIS — F51.01 PRIMARY INSOMNIA: ICD-10-CM

## 2022-09-29 DIAGNOSIS — G47.63 SLEEP-RELATED BRUXISM: ICD-10-CM

## 2022-09-29 PROCEDURE — G0463 HOSPITAL OUTPT CLINIC VISIT: HCPCS | Performed by: FAMILY MEDICINE

## 2022-09-29 PROCEDURE — 99213 OFFICE O/P EST LOW 20 MIN: CPT | Performed by: FAMILY MEDICINE

## 2022-09-29 RX ORDER — ESTRADIOL 2 MG/1
2 TABLET ORAL DAILY
COMMUNITY
Start: 2022-09-08 | End: 2023-03-17 | Stop reason: HOSPADM

## 2022-09-29 NOTE — PROGRESS NOTES
Follow Up [facility] Note     Chief Complaint: Insomnia    Primary Care Physician: Ginger Washington APRN Tina Carrie Shipman is a 56 y.o.female  was last seen at [facility] sleep lab: 11/19/2021.  At last visit discussed insomnia being a significant issue.  Had tried several sleep aids with her psychiatrist including Ambien Lunesta trazodone Ambien worked best however she eventually became tolerant to max dosage.  Also has chronic pain issues on oxycodone and tizanidine at last visit also on oxcarbazepine.  At last visit has been started on methylphenidate by psychiatrist for focus that she is 10 mg twice daily.    We discussed PSG as untreated sleep apnea can contribute to insomnia especially in female patients.  I advised PSG negative will consider CBT-I.  We also discussed sleep hygiene in great detail.  Discussed considering Belsomra however there was a class D interaction with her oxycodone and tizanidine.  PSG was canceled as patient had COVID infection which took a few months for her to recover from.  Patient reports she has worked on improving sleep hygiene.  Would like to do sleep study now.  No changes in pain medications.    Current Medications:    Current Outpatient Medications:   •  amphetamine-dextroamphetamine (ADDERALL) 10 MG tablet, TAKE 1 TABLET BY MOUTH TWO TIMES A DAY AS DIRECTED, Disp: , Rfl:   •  aspirin (aspirin) 81 MG EC tablet, Take 1 tablet by mouth Daily. Patient to stop 3 days prior to surgeyr., Disp: , Rfl:   •  desvenlafaxine (PRISTIQ) 50 MG 24 hr tablet, Take 50 mg by mouth Daily., Disp: , Rfl:   •  Diclofenac Sodium (VOLTAREN) 1 % gel gel, 4 g Daily As Needed., Disp: , Rfl:   •  Levoxyl 100 MCG tablet, Take 100 mcg by mouth Daily., Disp: , Rfl:   •  omeprazole (priLOSEC) 40 MG capsule, Take 1 capsule by mouth Daily., Disp: 30 capsule, Rfl: 12  •  OXcarbazepine (TRILEPTAL) 300 MG tablet, TAKE 1 TABLET BY MOUTH TWO TIMES A DAY FOR MOOD, Disp: , Rfl:   •  oxyCODONE-acetaminophen  (PERCOCET)  MG per tablet, oxycodone-acetaminophen 10 mg-325 mg tablet  TAKE 1 TABLET BY MOUTH EVERY 8 HOURS AS NEEDED FOR 30 DAYS, Disp: , Rfl:   •  rosuvastatin (CRESTOR) 10 MG tablet, Take 10 mg by mouth every night at bedtime., Disp: , Rfl:   •  tiZANidine (ZANAFLEX) 4 MG tablet, TAKE 1 TABLET BY MOUTH EVERY 8 HOURS AS NEEDED FOR 30 DAYS, Disp: , Rfl:   •  amoxicillin (AMOXIL) 500 MG capsule, TAKE 4 CAPSULES BY MOUTH 1 HOUR PRIOR TO SURGERY THEN THREE TIMES A DAY UNTIL FINISHED, Disp: , Rfl:   •  baclofen (LIORESAL) 10 MG tablet, Every 8 (Eight) Hours., Disp: , Rfl:   •  BUPROPION HCL PO, 1 tab(s), Disp: , Rfl:   •  busPIRone (BUSPAR) 30 MG tablet, Every 12 (Twelve) Hours., Disp: , Rfl:   •  diazePAM (VALIUM) 5 MG tablet, 1 tab(s), Disp: , Rfl:   •  estradiol (ESTRACE) 2 MG tablet, Take 2 mg by mouth Daily., Disp: , Rfl:   •  fenofibrate (TRICOR) 145 MG tablet, Daily., Disp: , Rfl:   •  levothyroxine (SYNTHROID, LEVOTHROID) 112 MCG tablet, Take 112 mcg by mouth Daily., Disp: , Rfl:   •  MULTIPLE VITAMINS-MINERALS ER PO, Daily., Disp: , Rfl:   •  oxyCODONE (ROXICODONE) 10 MG tablet, Every 8 (Eight) Hours., Disp: , Rfl:   •  oxyCODONE (Roxicodone) 5 MG immediate release tablet, Take 1-2 tablets by mouth Every 6 (Six) Hours As Needed for Severe Pain ., Disp: 20 tablet, Rfl: 0  •  phentermine 30 MG capsule, 1 cap(s), Disp: , Rfl:   •  predniSONE (DELTASONE) 10 MG tablet, , Disp: , Rfl:   •  QUEtiapine (SEROquel) 50 MG tablet, 1 tab, Disp: , Rfl:   •  Thyroid (ARMOUR THYROID) 60 MG PO tablet, Daily., Disp: , Rfl:   •  venlafaxine (EFFEXOR) 150 MG tablet sustained-release 24 hour 24 hr tablet, Daily., Disp: , Rfl:   •  Vortioxetine HBr (Trintellix) 5 MG tablet, Daily., Disp: , Rfl:   •  zolpidem (Ambien) 5 MG tablet, Take one tab as needed for sleep at night of sleep study only. Do not use at home., Disp: 2 tablet, Rfl: 0   also entered in Sleep Questionnaire    Patient  has a past medical history of Acid  "reflux, Arthritis, Decreased ROM of intervertebral discs of cervical spine, Dental contusion, High cholesterol, Multiple gastric ulcers, Sleep disorder, Stress incontinence, and Thyroid disease.    Social History:    Social History     Socioeconomic History   • Marital status:    Tobacco Use   • Smoking status: Current Some Day Smoker     Packs/day: 0.25     Types: Cigarettes   • Smokeless tobacco: Never Used   • Tobacco comment: trying to quit down to 5 cig a day , hasnt smoked in 24 hours   Vaping Use   • Vaping Use: Never used   Substance and Sexual Activity   • Alcohol use: Defer   • Drug use: Never   • Sexual activity: Defer       Allergies:  Nsaids and Pregabalin    Vital Signs:    Vitals:    09/29/22 1300   Pulse: 90   SpO2: 97%   Weight: 68.5 kg (151 lb 1.6 oz)   Height: 154.9 cm (60.98\")     Body mass index is 28.57 kg/m².    REVIEW OF SYSTEMS.  Full review of systems available on the intake form which is scanned in the media tab.  The relevant positive are noted below  1. Daytime excessive sleepiness with Walker Sleepiness Scale :Total score: 2   2. Snoring      Physical exam:  Vitals:    09/29/22 1300   Pulse: 90   SpO2: 97%   Weight: 68.5 kg (151 lb 1.6 oz)   Height: 154.9 cm (60.98\")    Body mass index is 28.57 kg/m².    HEENT: Head is atraumatic, normocephalic  Eyes: pupils are round equal and reacting to light and accommodation, conjunctiva normal  RESPIRATORY SYSTEM: Regular respirations  CARDIOVASULAR SYSTEM: Regular rate  EXTREMITES: No cyanosis, clubbing  NEUROLOGICAL SYSTEM: Oriented x 3, no gross motor defects, gait normal    Impression:  1. Hypersomnia    2. Primary insomnia    3. Non-restorative sleep    4. Sleep-related bruxism        Follow-up home sleep study.  If negative follow-up for Dr. Kenan Mcdaniels for CBT-I.    Candis Redd MD  Sleep Medicine  09/29/22  13:49 EDT      " Elevated troponin, will be repeated tonight. ASA BB. Repeat ECG after transfusion Elevated troponin, will be repeated tonight. ASA BB. Repeat ECG after transfusion. Echo

## 2022-10-03 ENCOUNTER — HOSPITAL ENCOUNTER (OUTPATIENT)
Dept: CARDIOLOGY | Facility: HOSPITAL | Age: 57
Discharge: HOME | End: 2022-10-03
Admitting: NURSE PRACTITIONER

## 2022-10-03 ENCOUNTER — TRANSCRIBE ORDERS (OUTPATIENT)
Dept: LAB | Facility: HOSPITAL | Age: 57
End: 2022-10-03

## 2022-10-03 DIAGNOSIS — F41.9 INSOMNIA SECONDARY TO ANXIETY: ICD-10-CM

## 2022-10-03 DIAGNOSIS — F41.1 GENERALIZED ANXIETY DISORDER: Primary | ICD-10-CM

## 2022-10-03 DIAGNOSIS — F51.05 INSOMNIA SECONDARY TO ANXIETY: ICD-10-CM

## 2022-10-03 DIAGNOSIS — F90.0 ATTENTION DEFICIT HYPERACTIVITY DISORDER, INATTENTIVE TYPE: ICD-10-CM

## 2022-10-03 PROCEDURE — 93005 ELECTROCARDIOGRAM TRACING: CPT

## 2022-10-03 PROCEDURE — 93010 ELECTROCARDIOGRAM REPORT: CPT | Performed by: INTERNAL MEDICINE

## 2022-10-06 LAB — QT INTERVAL: 387 MS

## 2022-10-09 NOTE — PROGRESS NOTES
Chief Complaint:  Heartburn    Primary Care Provider: Ginger Washington APRN    Referring Provider: No ref. provider found    History of Present Illness  Izabela Shipman is a 56 y.o. female for referral for gastritis and GERD.  Patient is known to me as I performed an EGD on 5/11/2021.  Copy and paste of relevant background information is available below.  The patient has been taking omeprazole 40 mg daily.  She ran out of her prescription a couple weeks ago.  She has been having heartburn quite a bit and she also has reflux every now and then.  She has a known small hiatal hernia.  Patient does not drink carbonated drinks.  She does however drink quite a bit of coffee and she also drinks tea.  She does not smoke cigarettes.  No difficulty swallowing.      Copy and Paste of HPI from Office Visit on 5/25/21  Patient is here for follow-up after I performed an EGD and a colonoscopy on 5/11/2021.  The indications for the procedure are available in my procedure note dated 5/11/2021.  Regarding the colonoscopy, I removed a tubular adenoma and the patient needs to get her next colonoscopy in 3 years.  Notably patient's mother and 2 maternal uncles had colon cancer.  Regarding the EGD, there was a small hiatal hernia and there was evidence of gastritis confirmed with biopsy.  Biopsies showed no evidence of H. pylori.  The patient takes Pepcid.  We talked about stopping Pepcid and changing to omeprazole.  She is agreeable with that.  Plan is to give a prescription for omeprazole 40 mg each morning with the a 6-month refill and to get another colonoscopy in 3 years.  I will send a letter to TR Daily the referring clinician and ask her to take over the medicine refill after 6 months.      Allergies: Nsaids and Pregabalin    Outpatient Medications Marked as Taking for the 10/10/22 encounter (Office Visit) with Dick Clark MD   Medication Sig Dispense Refill   • Adderall XR 30 MG 24 hr capsule Take 1 capsule by mouth  Every Morning     • amphetamine-dextroamphetamine (ADDERALL) 10 MG tablet TAKE 1 TABLET BY MOUTH TWO TIMES A DAY AS DIRECTED     • desvenlafaxine (PRISTIQ) 50 MG 24 hr tablet Take 50 mg by mouth Daily.     • Diclofenac Sodium (VOLTAREN) 1 % gel gel 4 g Daily As Needed.     • estradiol (ESTRACE) 2 MG tablet Take 2 mg by mouth Daily.     • Levoxyl 100 MCG tablet Take 100 mcg by mouth Daily.     • omeprazole (priLOSEC) 40 MG capsule Take 1 capsule by mouth Daily. 30 capsule 12   • OXcarbazepine (TRILEPTAL) 300 MG tablet TAKE 1 TABLET BY MOUTH TWO TIMES A DAY FOR MOOD     • oxyCODONE-acetaminophen (PERCOCET)  MG per tablet oxycodone-acetaminophen 10 mg-325 mg tablet   TAKE 1 TABLET BY MOUTH EVERY 8 HOURS AS NEEDED FOR 30 DAYS     • rosuvastatin (CRESTOR) 20 MG tablet Take 1 tablet by mouth every night at bedtime.     • Semaglutide-Weight Management (Wegovy) 0.25 MG/0.5ML solution auto-injector Inject 0.25 mg under the skin into the appropriate area as directed.     • tiZANidine (ZANAFLEX) 4 MG tablet TAKE 1 TABLET BY MOUTH EVERY 8 HOURS AS NEEDED FOR 30 DAYS         Past Medical History:   • Acid reflux   • Arthritis   • Decreased ROM of intervertebral discs of cervical spine    due to neck surgery   • Dental contusion   • High cholesterol   • Multiple gastric ulcers   • Sleep disorder   • Stress incontinence   • Thyroid disease        Past Surgical History:   • ABDOMINAL SURGERY   • COLONOSCOPY   • EXPLORATORY LAPAROTOMY   • HYSTERECTOMY   • MOUTH SURGERY   • NECK SURGERY   • PUBOVAGINAL SLING    Procedure: MID-URETHRAL SLING WITH CYSTOSCOPY;  Surgeon: Bre Buckner MD;  Location: [facility] MAIN OR;  Service: Urology;  Laterality: N/A;   • ROTATOR CUFF REPAIR       Family History:   Family History   Problem Relation Age of Onset   • Ulcers Father    • Cancer Mother         Colon cancer   • Thyroid disease Mother    • Heart disease Other    • Thyroid disease Other    • Arthritis Brother    • Diabetes Brother      "    Social History:  Social History     Tobacco Use   • Smoking status: Former     Packs/day: 0.25     Types: Cigarettes     Quit date: 2020     Years since quittin.3   • Smokeless tobacco: Never   • Tobacco comments:     trying to quit down to 5 cig a day , hasnt smoked in 24 hours   Substance Use Topics   • Alcohol use: Never       Objective     Vital Signs:  Resp 14   Ht 154.9 cm (61\")   Wt 67.6 kg (149 lb)   BMI 28.15 kg/m²   • Constitutional: alert, no acute distress, reliable historian  • HENT:  NCAT, no visible deformities or lesions  • Eyes:  sclerae clear, conjunctivae clear, EOMI  • Neck:  normal appearance, no masses, trachea midline  • Respiratory:  breathing not labored, respiratory effort appears normal  • Cardiovascular:  heart regular rate  • Abdomen:  soft, nontender, nondistended    • Skin and subcutaneous tissue:  no visible concerning rashes or lesions, no jaundice  • Musculoskeletal: moving all extremities symmetrically and purposefully  • Neurologic:  no obvious motor or sensory deficits, normal gait, able to stand without difficulty, cerebellar function without any obvious abnormalities, alert & oriented x 3, speech clear  • Psychiatric:  judgment and insight intact, mood normal, affect appropriate, cooperative      Assessment:  Heartburn  Gastroesophageal reflux disease  Hiatal hernia    Plan:  Will change omeprazole from 40 mg daily to 40 mg BID.  If patient continues to have symptoms despite maximum dose PPI then she needs to consider being evaluated for hiatal hernia repair.    Dick Clark MD  10/10/2022    Electronically signed by Dick Clark MD, 10/09/22, 12:18 PM EDT.      "

## 2022-10-10 ENCOUNTER — OFFICE VISIT (OUTPATIENT)
Dept: SURGERY | Facility: CLINIC | Age: 57
End: 2022-10-10

## 2022-10-10 VITALS — BODY MASS INDEX: 28.13 KG/M2 | WEIGHT: 149 LBS | RESPIRATION RATE: 14 BRPM | HEIGHT: 61 IN

## 2022-10-10 DIAGNOSIS — K44.9 HIATAL HERNIA: ICD-10-CM

## 2022-10-10 DIAGNOSIS — R12 HEARTBURN: Primary | ICD-10-CM

## 2022-10-10 DIAGNOSIS — K21.9 GASTROESOPHAGEAL REFLUX DISEASE, UNSPECIFIED WHETHER ESOPHAGITIS PRESENT: ICD-10-CM

## 2022-10-10 PROCEDURE — 99212 OFFICE O/P EST SF 10 MIN: CPT | Performed by: SURGERY

## 2022-10-10 RX ORDER — CHLORAL HYDRATE 500 MG
CAPSULE ORAL EVERY 24 HOURS
COMMUNITY
Start: 2022-09-07 | End: 2023-03-17 | Stop reason: HOSPADM

## 2022-10-10 RX ORDER — METHYLPREDNISOLONE 4 MG/1
TABLET ORAL EVERY 24 HOURS
COMMUNITY
Start: 2022-09-08

## 2022-10-10 RX ORDER — OXCARBAZEPINE 300 MG/1
TABLET, FILM COATED ORAL EVERY 12 HOURS SCHEDULED
COMMUNITY

## 2022-10-10 RX ORDER — DEXTROAMPHETAMINE SACCHARATE, AMPHETAMINE ASPARTATE, DEXTROAMPHETAMINE SULFATE AND AMPHETAMINE SULFATE 5; 5; 5; 5 MG/1; MG/1; MG/1; MG/1
TABLET ORAL EVERY 24 HOURS
COMMUNITY
End: 2023-03-17 | Stop reason: HOSPADM

## 2022-10-10 RX ORDER — DEXTROAMPHETAMINE SULFATE, DEXTROAMPHETAMINE SACCHARATE, AMPHETAMINE SULFATE AND AMPHETAMINE ASPARTATE 7.5; 7.5; 7.5; 7.5 MG/1; MG/1; MG/1; MG/1
30 CAPSULE, EXTENDED RELEASE ORAL EVERY MORNING
COMMUNITY
Start: 2022-10-03

## 2022-10-10 RX ORDER — SEMAGLUTIDE 0.25 MG/.5ML
0.25 INJECTION, SOLUTION SUBCUTANEOUS
COMMUNITY
Start: 2022-10-03 | End: 2023-03-17 | Stop reason: HOSPADM

## 2022-10-10 RX ORDER — OMEPRAZOLE 20 MG/1
40 CAPSULE, DELAYED RELEASE ORAL 2 TIMES DAILY
Qty: 60 CAPSULE | Refills: 6 | Status: SHIPPED | OUTPATIENT
Start: 2022-10-10 | End: 2022-10-13

## 2022-10-10 RX ORDER — HYDROXYZINE PAMOATE 25 MG/1
CAPSULE ORAL
COMMUNITY
Start: 2022-09-08

## 2022-10-10 RX ORDER — TRIAMCINOLONE ACETONIDE 5 MG/G
CREAM TOPICAL
COMMUNITY
Start: 2022-09-08 | End: 2023-03-17 | Stop reason: HOSPADM

## 2022-10-10 RX ORDER — ROSUVASTATIN CALCIUM 20 MG/1
20 TABLET, COATED ORAL
COMMUNITY
Start: 2022-09-07 | End: 2023-03-17 | Stop reason: HOSPADM

## 2022-10-10 RX ORDER — OMEPRAZOLE 40 MG/1
CAPSULE, DELAYED RELEASE ORAL EVERY 24 HOURS
COMMUNITY
End: 2023-03-17 | Stop reason: HOSPADM

## 2022-10-10 RX ORDER — LEVOTHYROXINE SODIUM 0.1 MG/1
TABLET ORAL EVERY 24 HOURS
COMMUNITY

## 2022-10-10 RX ORDER — TRIAMCINOLONE ACETONIDE 5 MG/G
CREAM TOPICAL EVERY 12 HOURS SCHEDULED
COMMUNITY
Start: 2022-09-08 | End: 2023-03-17 | Stop reason: HOSPADM

## 2022-10-10 RX ORDER — FLUCONAZOLE 200 MG/1
TABLET ORAL
COMMUNITY
Start: 2022-09-08 | End: 2023-03-17 | Stop reason: HOSPADM

## 2022-10-10 RX ORDER — ESTRADIOL 2 MG/1
TABLET ORAL EVERY 24 HOURS
COMMUNITY
End: 2023-03-17 | Stop reason: HOSPADM

## 2022-10-13 ENCOUNTER — TELEPHONE (OUTPATIENT)
Dept: SURGERY | Facility: CLINIC | Age: 57
End: 2022-10-13

## 2022-10-13 DIAGNOSIS — K44.9 HIATAL HERNIA: ICD-10-CM

## 2022-10-13 DIAGNOSIS — K21.9 GASTROESOPHAGEAL REFLUX DISEASE, UNSPECIFIED WHETHER ESOPHAGITIS PRESENT: ICD-10-CM

## 2022-10-13 DIAGNOSIS — R12 HEARTBURN: Primary | ICD-10-CM

## 2022-10-13 RX ORDER — OMEPRAZOLE 40 MG/1
40 CAPSULE, DELAYED RELEASE ORAL 2 TIMES DAILY
Qty: 60 CAPSULE | Refills: 6 | Status: SHIPPED | OUTPATIENT
Start: 2022-10-13

## 2022-10-13 NOTE — TELEPHONE ENCOUNTER
Pt prescription of Omeprazole 40mg BID for 30 days with 6 refills required a PA. I have done a verbal PA with Megan ANNE at [facility] prescription department and have gotten an approval. I called and let pt know.  Prescription has been sent into [facility] per pt request.  Approval letter has been scanned into chart.

## 2022-11-22 ENCOUNTER — HOSPITAL ENCOUNTER (OUTPATIENT)
Dept: SLEEP MEDICINE | Facility: HOSPITAL | Age: 57
End: 2022-11-22

## 2022-11-22 DIAGNOSIS — F51.01 PRIMARY INSOMNIA: ICD-10-CM

## 2022-11-22 DIAGNOSIS — G47.10 HYPERSOMNIA: ICD-10-CM

## 2022-11-22 DIAGNOSIS — G47.8 NON-RESTORATIVE SLEEP: ICD-10-CM

## 2022-11-22 DIAGNOSIS — G47.63 SLEEP-RELATED BRUXISM: ICD-10-CM

## 2022-11-22 PROCEDURE — 95806 SLEEP STUDY UNATT&RESP EFFT: CPT

## 2022-12-08 DIAGNOSIS — E66.3 OVERWEIGHT (BMI 25.0-29.9): ICD-10-CM

## 2022-12-08 DIAGNOSIS — G47.8 NON-RESTORATIVE SLEEP: ICD-10-CM

## 2022-12-08 DIAGNOSIS — G47.10 HYPERSOMNIA: ICD-10-CM

## 2022-12-08 DIAGNOSIS — F51.01 PRIMARY INSOMNIA: Primary | ICD-10-CM

## 2022-12-08 DIAGNOSIS — G47.63 SLEEP-RELATED BRUXISM: ICD-10-CM

## 2022-12-19 ENCOUNTER — TRANSCRIBE ORDERS (OUTPATIENT)
Dept: ADMINISTRATIVE | Facility: HOSPITAL | Age: 57
End: 2022-12-19

## 2022-12-19 DIAGNOSIS — K44.9 HIATAL HERNIA: Primary | ICD-10-CM

## 2023-01-31 ENCOUNTER — HOSPITAL ENCOUNTER (OUTPATIENT)
Dept: GENERAL RADIOLOGY | Facility: HOSPITAL | Age: 58
Discharge: HOME | End: 2023-01-31
Admitting: NURSE PRACTITIONER
Payer: COMMERCIAL

## 2023-01-31 DIAGNOSIS — K44.9 HIATAL HERNIA: ICD-10-CM

## 2023-01-31 PROCEDURE — 74246 X-RAY XM UPR GI TRC 2CNTRST: CPT

## 2023-01-31 RX ADMIN — BARIUM SULFATE 355 ML: 0.6 SUSPENSION ORAL at 09:55

## 2023-01-31 RX ADMIN — BARIUM SULFATE 700 MG: 700 TABLET ORAL at 09:55

## 2023-01-31 RX ADMIN — BARIUM SULFATE 135 ML: 980 POWDER, FOR SUSPENSION ORAL at 09:55

## 2023-01-31 RX ADMIN — ANTACID/ANTIFLATULENT 1 PACKET: 380; 550; 10; 10 GRANULE, EFFERVESCENT ORAL at 09:55

## 2023-03-01 ENCOUNTER — HOSPITAL ENCOUNTER (OUTPATIENT)
Dept: SLEEP MEDICINE | Facility: HOSPITAL | Age: 58
Discharge: HOME | End: 2023-03-01
Admitting: FAMILY MEDICINE
Payer: COMMERCIAL

## 2023-03-01 DIAGNOSIS — E66.3 OVERWEIGHT (BMI 25.0-29.9): ICD-10-CM

## 2023-03-01 DIAGNOSIS — G47.8 NON-RESTORATIVE SLEEP: ICD-10-CM

## 2023-03-01 DIAGNOSIS — F51.01 PRIMARY INSOMNIA: ICD-10-CM

## 2023-03-01 DIAGNOSIS — G47.10 HYPERSOMNIA: ICD-10-CM

## 2023-03-01 DIAGNOSIS — G47.63 SLEEP-RELATED BRUXISM: ICD-10-CM

## 2023-03-01 PROCEDURE — 95806 SLEEP STUDY UNATT&RESP EFFT: CPT

## 2023-03-06 PROCEDURE — 95806 SLEEP STUDY UNATT&RESP EFFT: CPT | Performed by: INTERNAL MEDICINE

## 2023-03-08 DIAGNOSIS — G47.33 OSA (OBSTRUCTIVE SLEEP APNEA): Primary | ICD-10-CM

## 2023-03-08 DIAGNOSIS — R06.83 SNORING: ICD-10-CM

## 2023-03-09 ENCOUNTER — TELEPHONE (OUTPATIENT)
Dept: SLEEP MEDICINE | Facility: HOSPITAL | Age: 58
End: 2023-03-09
Payer: COMMERCIAL

## 2023-05-12 NOTE — PROGRESS NOTES
"  [address] [address]  Phone: [phone]  Fax: [phone]        SLEEP CLINIC FOLLOW-UP PROGRESS NOTE    Izabela Shipman  [id number]   1965  57 y.o.  female      PCP: Ginger Washington APRN    DATE OF VISIT: 5/15/2023        CHIEF COMPLAINT: Obstructive sleep apnea, insomnia    HPI:  This is a 57 y.o. year old patient of Dr. Redd who presents to the clinic today for the management of obstructive sleep apnea and insomnia.  She was seen 9/2022 and sleep study was recommended to rule out sleep apnea as a contributing cause.  If sleep study was negative, plan was for patient to see Dr. Mcdaniels for CBT-I.  Patient had a home sleep study 3/2023 showing severe obstructive sleep apnea with AHI of 33/hr.  This patient is using positive airway pressure therapy with auto CPAP, and the symptoms of sleep apnea have improved with this therapy, can tell a difference when she uses it.  Has been sick a couple of times over the last month.  Patient usually goes to bed around 9:30-10 PM and wakes up around 3:30-5 AM .  Can take 30-60 minutes to fall asleep.       MEDICATIONS: reviewed     ALLERGIES:  Nsaids and Pregabalin    SOCIAL HISTORY (habits pertaining to sleep medicine):  • History tobacco use: No   • History of alcohol use: 0 per week  • Caffeine use: 0     REVIEW OF SYSTEMS:   Pertinent positive symptoms are:  • Anselmo Sleepiness Scale :Total score: 7   • Anxiety   • Depression  • Dry mouth/nose--- discussed adjusting humidification as needed        PHYSICAL EXAMINATION:  CONSTITUTIONAL:  Vitals:    05/15/23 0900   BP: 115/66   Pulse: 73   SpO2: 97%   Weight: 68.5 kg (151 lb)   Height: 154.9 cm (60.98\")    Body mass index is 28.55 kg/m².   HEAD: atraumatic, normocephalic  RESP SYSTEM: not in respiratory distress, breathing unlabored  CARDIOVASULAR: normal rate, no edema noted   NEURO: Alert and oriented x 3, mood and affect appeared appropriate      DATA " REVIEWED:  The Smart card downloaded on 5/15/2023 has been reviewed independently by me for compliance and discussed the data with the patient.   Compliance: 43%  More than 4 hr use: 7 %  Average use of the device: 1 hour 49 minutes per night  Residual AHI: 1.9 /hr (goal < 5.0 /hr)  Mask type: Nasal  Device: AirSense 11 auto CPAP, ResMed  DME: [employer]      ASSESSMENT AND PLAN:  · Severe obstructive Sleep Apnea (G 47.33): sleep apnea symptoms have improved with the device and treatment.  Patient has had a couple of illnesses in the last month since she got the CPAP and had a hard time tolerating mask with no stopped up.  She has increased use over the last week.  She feels that the headgear that she has for her nasal mask does not fit well when she lays down.  She is going to discuss further with DME company to discuss maybe different size or different headgear type and may be she would tolerate with fx Steffanie headgear type better.  She does not feel that any pressure adjustments are needed.  She feels improved when she does use the Pap.  I have personally reviewed the smart card download and discussed the download data with the patient and encouarged continued use of the device.  The residual AHI is acceptable. The device is benefiting the patient and the device is medically necessary.  Without adequate treatment of sleep apnea and without good compliance, patient would be at increased risk of hypertension, diabetes mellitus, pulmonary hypertension, atrial fibrillation, stroke, and nonrestorative sleep with hypersomnia which could increase risk of motor vehicle accidents. The patient is also instructed to get the supplies from the [employer] and and change them on a regular basis.  A prescription for supplies has been sent to the [facility].  I have also discussed with this patient the importance of good sleep hygiene and getting an adequate amount of sleep to aid in overall good health.   · Overweight: Body mass  index is 28.55 kg/m².. Patients who are overweight or obese are at increased risk of sleep apnea/ sleep disordered breathing. Weight reduction and healthy lifestyle are encouraged in overweight/ obese patients as part of a comprehensive approach to sleep apnea treatment.    · Insomnia: Sleep apnea likely contributing      Patient will follow-up in 8 weeks with Dr. Redd or follow-up sooner for any issues or concerns.  Patient's questions were answered.          Thank you for allowing me to participate in the care of this patient.     Beba Godoy DNP, APRN  [facility] Sleep Medicine

## 2023-05-15 ENCOUNTER — OFFICE VISIT (OUTPATIENT)
Dept: SLEEP MEDICINE | Facility: HOSPITAL | Age: 58
End: 2023-05-15
Payer: COMMERCIAL

## 2023-05-15 VITALS
BODY MASS INDEX: 28.51 KG/M2 | SYSTOLIC BLOOD PRESSURE: 115 MMHG | OXYGEN SATURATION: 97 % | HEART RATE: 73 BPM | DIASTOLIC BLOOD PRESSURE: 66 MMHG | WEIGHT: 151 LBS | HEIGHT: 61 IN

## 2023-05-15 DIAGNOSIS — Z99.89 OSA ON CPAP: Primary | ICD-10-CM

## 2023-05-15 DIAGNOSIS — E66.3 OVERWEIGHT (BMI 25.0-29.9): ICD-10-CM

## 2023-05-15 DIAGNOSIS — G47.33 OSA ON CPAP: Primary | ICD-10-CM

## 2023-05-15 PROCEDURE — G0463 HOSPITAL OUTPT CLINIC VISIT: HCPCS

## 2023-08-25 DIAGNOSIS — K21.9 GASTROESOPHAGEAL REFLUX DISEASE, UNSPECIFIED WHETHER ESOPHAGITIS PRESENT: ICD-10-CM

## 2023-08-25 DIAGNOSIS — R12 HEARTBURN: ICD-10-CM

## 2023-08-25 DIAGNOSIS — K44.9 HIATAL HERNIA: ICD-10-CM

## 2023-08-25 RX ORDER — OMEPRAZOLE 40 MG/1
CAPSULE, DELAYED RELEASE ORAL
Qty: 60 CAPSULE | Refills: 0 | Status: SHIPPED | OUTPATIENT
Start: 2023-08-25

## 2024-04-01 ENCOUNTER — TRANSCRIBE ORDERS (OUTPATIENT)
Dept: ADMINISTRATIVE | Facility: HOSPITAL | Age: 59
End: 2024-04-01
Payer: COMMERCIAL

## 2024-04-01 DIAGNOSIS — Z12.31 VISIT FOR SCREENING MAMMOGRAM: Primary | ICD-10-CM

## 2024-04-17 ENCOUNTER — TELEPHONE (OUTPATIENT)
Dept: GASTROENTEROLOGY | Facility: CLINIC | Age: 59
End: 2024-04-17
Payer: COMMERCIAL

## 2024-08-14 ENCOUNTER — HOSPITAL ENCOUNTER (OUTPATIENT)
Dept: MAMMOGRAPHY | Facility: HOSPITAL | Age: 59
Discharge: HOME | End: 2024-08-14
Admitting: NURSE PRACTITIONER
Payer: COMMERCIAL

## 2024-08-14 DIAGNOSIS — Z12.31 VISIT FOR SCREENING MAMMOGRAM: ICD-10-CM

## 2024-08-14 PROCEDURE — 77063 BREAST TOMOSYNTHESIS BI: CPT

## 2024-08-14 PROCEDURE — 77067 SCR MAMMO BI INCL CAD: CPT

## 2024-08-22 ENCOUNTER — HOSPITAL ENCOUNTER (OUTPATIENT)
Dept: GENERAL RADIOLOGY | Facility: HOSPITAL | Age: 59
Discharge: HOME | End: 2024-08-22
Payer: COMMERCIAL

## 2024-08-22 ENCOUNTER — TRANSCRIBE ORDERS (OUTPATIENT)
Dept: ADMINISTRATIVE | Facility: HOSPITAL | Age: 59
End: 2024-08-22
Payer: COMMERCIAL

## 2024-08-22 ENCOUNTER — APPOINTMENT (OUTPATIENT)
Dept: GENERAL RADIOLOGY | Facility: HOSPITAL | Age: 59
End: 2024-08-22
Payer: COMMERCIAL

## 2024-08-22 ENCOUNTER — LAB (OUTPATIENT)
Dept: LAB | Facility: HOSPITAL | Age: 59
End: 2024-08-22
Payer: COMMERCIAL

## 2024-08-22 DIAGNOSIS — Z79.899 ENCOUNTER FOR LONG-TERM (CURRENT) USE OF OTHER MEDICATIONS: ICD-10-CM

## 2024-08-22 DIAGNOSIS — R60.9 SWELLING: ICD-10-CM

## 2024-08-22 DIAGNOSIS — E55.9 VITAMIN D DEFICIENCY: ICD-10-CM

## 2024-08-22 DIAGNOSIS — M25.50 ARTHRALGIA, UNSPECIFIED JOINT: ICD-10-CM

## 2024-08-22 DIAGNOSIS — M25.50 ARTHRALGIA, UNSPECIFIED JOINT: Primary | ICD-10-CM

## 2024-08-22 DIAGNOSIS — Z11.1 SCREENING-PULMONARY TB: ICD-10-CM

## 2024-08-22 DIAGNOSIS — Z11.59 SCREENING EXAMINATION FOR POLIOMYELITIS: Primary | ICD-10-CM

## 2024-08-22 DIAGNOSIS — Z11.59 SCREENING EXAMINATION FOR POLIOMYELITIS: ICD-10-CM

## 2024-08-22 LAB
25(OH)D3 SERPL-MCNC: 42.6 NG/ML (ref 30–100)
BASOPHILS # BLD AUTO: 0.07 10*3/MM3 (ref 0–0.2)
BASOPHILS NFR BLD AUTO: 0.9 % (ref 0–1.5)
CHROMATIN AB SERPL-ACNC: <10 IU/ML (ref 0–14)
CRP SERPL-MCNC: 0.67 MG/DL (ref 0–0.5)
DEPRECATED RDW RBC AUTO: 43.9 FL (ref 37–54)
EOSINOPHIL # BLD AUTO: 0.16 10*3/MM3 (ref 0–0.4)
EOSINOPHIL NFR BLD AUTO: 2.1 % (ref 0.3–6.2)
ERYTHROCYTE [DISTWIDTH] IN BLOOD BY AUTOMATED COUNT: 14 % (ref 12.3–15.4)
ERYTHROCYTE [SEDIMENTATION RATE] IN BLOOD: 11 MM/HR (ref 0–30)
HAV IGM SERPL QL IA: NORMAL
HBV CORE IGM SERPL QL IA: NORMAL
HBV SURFACE AG SERPL QL IA: NORMAL
HCT VFR BLD AUTO: 40.2 % (ref 34–46.6)
HCV AB SER QL: NORMAL
HGB BLD-MCNC: 13 G/DL (ref 12–15.9)
IMM GRANULOCYTES # BLD AUTO: 0.03 10*3/MM3 (ref 0–0.05)
IMM GRANULOCYTES NFR BLD AUTO: 0.4 % (ref 0–0.5)
IRON 24H UR-MRATE: 70 MCG/DL (ref 37–145)
IRON SATN MFR SERPL: 18 % (ref 20–50)
LYMPHOCYTES # BLD AUTO: 2.57 10*3/MM3 (ref 0.7–3.1)
LYMPHOCYTES NFR BLD AUTO: 33.4 % (ref 19.6–45.3)
MCH RBC QN AUTO: 27.8 PG (ref 26.6–33)
MCHC RBC AUTO-ENTMCNC: 32.3 G/DL (ref 31.5–35.7)
MCV RBC AUTO: 85.9 FL (ref 79–97)
MONOCYTES # BLD AUTO: 0.74 10*3/MM3 (ref 0.1–0.9)
MONOCYTES NFR BLD AUTO: 9.6 % (ref 5–12)
NEUTROPHILS NFR BLD AUTO: 4.12 10*3/MM3 (ref 1.7–7)
NEUTROPHILS NFR BLD AUTO: 53.6 % (ref 42.7–76)
NRBC BLD AUTO-RTO: 0 /100 WBC (ref 0–0.2)
PLATELET # BLD AUTO: 347 10*3/MM3 (ref 140–450)
PMV BLD AUTO: 10.2 FL (ref 6–12)
RBC # BLD AUTO: 4.68 10*6/MM3 (ref 3.77–5.28)
TIBC SERPL-MCNC: 392 MCG/DL (ref 298–536)
TRANSFERRIN SERPL-MCNC: 263 MG/DL (ref 200–360)
URATE SERPL-MCNC: 3.7 MG/DL (ref 2.4–5.7)
WBC NRBC COR # BLD AUTO: 7.69 10*3/MM3 (ref 3.4–10.8)

## 2024-08-22 PROCEDURE — 84466 ASSAY OF TRANSFERRIN: CPT

## 2024-08-22 PROCEDURE — 85025 COMPLETE CBC W/AUTO DIFF WBC: CPT

## 2024-08-22 PROCEDURE — 86140 C-REACTIVE PROTEIN: CPT

## 2024-08-22 PROCEDURE — 86200 CCP ANTIBODY: CPT

## 2024-08-22 PROCEDURE — 86038 ANTINUCLEAR ANTIBODIES: CPT

## 2024-08-22 PROCEDURE — 85652 RBC SED RATE AUTOMATED: CPT

## 2024-08-22 PROCEDURE — 83540 ASSAY OF IRON: CPT

## 2024-08-22 PROCEDURE — 36415 COLL VENOUS BLD VENIPUNCTURE: CPT

## 2024-08-22 PROCEDURE — 84550 ASSAY OF BLOOD/URIC ACID: CPT

## 2024-08-22 PROCEDURE — 73630 X-RAY EXAM OF FOOT: CPT

## 2024-08-22 PROCEDURE — 80074 ACUTE HEPATITIS PANEL: CPT

## 2024-08-22 PROCEDURE — 73110 X-RAY EXAM OF WRIST: CPT

## 2024-08-22 PROCEDURE — 86431 RHEUMATOID FACTOR QUANT: CPT

## 2024-08-22 PROCEDURE — 81374 HLA I TYPING 1 ANTIGEN LR: CPT

## 2024-08-22 PROCEDURE — 71046 X-RAY EXAM CHEST 2 VIEWS: CPT

## 2024-08-22 PROCEDURE — 73130 X-RAY EXAM OF HAND: CPT

## 2024-08-22 PROCEDURE — 73610 X-RAY EXAM OF ANKLE: CPT

## 2024-08-22 PROCEDURE — 82306 VITAMIN D 25 HYDROXY: CPT

## 2024-08-22 PROCEDURE — 72202 X-RAY EXAM SI JOINTS 3/> VWS: CPT

## 2024-08-23 LAB — CCP IGA+IGG SERPL IA-ACNC: 4 UNITS (ref 0–19)

## 2024-08-26 LAB — ANA SER QL IF: NEGATIVE

## 2024-08-29 LAB — HLA-B27 QL NAA+PROBE: NEGATIVE

## 2024-09-19 ENCOUNTER — OFFICE VISIT (OUTPATIENT)
Dept: GASTROENTEROLOGY | Facility: CLINIC | Age: 59
End: 2024-09-19
Payer: COMMERCIAL

## 2024-09-19 VITALS
WEIGHT: 146 LBS | OXYGEN SATURATION: 100 % | SYSTOLIC BLOOD PRESSURE: 137 MMHG | BODY MASS INDEX: 27.56 KG/M2 | HEART RATE: 86 BPM | DIASTOLIC BLOOD PRESSURE: 78 MMHG | HEIGHT: 61 IN

## 2024-09-19 DIAGNOSIS — R12 HEARTBURN: ICD-10-CM

## 2024-09-19 DIAGNOSIS — K31.1 PYLORIC STENOSIS: ICD-10-CM

## 2024-09-19 DIAGNOSIS — R10.13 EPIGASTRIC PAIN: Primary | ICD-10-CM

## 2024-09-19 RX ORDER — PANTOPRAZOLE SODIUM 40 MG/1
40 TABLET, DELAYED RELEASE ORAL DAILY
Qty: 90 TABLET | Refills: 1 | Status: SHIPPED | OUTPATIENT
Start: 2024-09-19 | End: 2024-09-23 | Stop reason: HOSPADM

## 2024-09-19 RX ORDER — NICOTINE 21 MG/24HR
PATCH, TRANSDERMAL 24 HOURS TRANSDERMAL
COMMUNITY

## 2024-09-20 ENCOUNTER — ANESTHESIA EVENT (OUTPATIENT)
Dept: GASTROENTEROLOGY | Facility: HOSPITAL | Age: 59
End: 2024-09-20
Payer: COMMERCIAL

## 2024-09-23 ENCOUNTER — ANESTHESIA (OUTPATIENT)
Dept: GASTROENTEROLOGY | Facility: HOSPITAL | Age: 59
End: 2024-09-23
Payer: COMMERCIAL

## 2024-09-23 ENCOUNTER — HOSPITAL ENCOUNTER (OUTPATIENT)
Facility: HOSPITAL | Age: 59
Setting detail: HOSPITAL OUTPATIENT SURGERY
Discharge: HOME | End: 2024-09-23
Attending: INTERNAL MEDICINE | Admitting: INTERNAL MEDICINE
Payer: COMMERCIAL

## 2024-09-23 VITALS
DIASTOLIC BLOOD PRESSURE: 66 MMHG | BODY MASS INDEX: 26.76 KG/M2 | WEIGHT: 141.54 LBS | SYSTOLIC BLOOD PRESSURE: 111 MMHG | RESPIRATION RATE: 17 BRPM | HEART RATE: 79 BPM | TEMPERATURE: 98 F | OXYGEN SATURATION: 95 %

## 2024-09-23 DIAGNOSIS — R12 HEARTBURN: ICD-10-CM

## 2024-09-23 DIAGNOSIS — R10.13 EPIGASTRIC PAIN: ICD-10-CM

## 2024-09-23 DIAGNOSIS — K31.1 PYLORIC STENOSIS: ICD-10-CM

## 2024-09-23 PROCEDURE — 25010000002 PROPOFOL 10 MG/ML EMULSION

## 2024-09-23 PROCEDURE — 25810000003 LACTATED RINGERS PER 1000 ML: Performed by: NURSE ANESTHETIST, CERTIFIED REGISTERED

## 2024-09-23 PROCEDURE — 25810000003 LACTATED RINGERS PER 1000 ML

## 2024-09-23 PROCEDURE — 43239 EGD BIOPSY SINGLE/MULTIPLE: CPT | Performed by: INTERNAL MEDICINE

## 2024-09-23 PROCEDURE — 88305 TISSUE EXAM BY PATHOLOGIST: CPT | Performed by: INTERNAL MEDICINE

## 2024-09-23 RX ORDER — PANTOPRAZOLE SODIUM 40 MG/1
40 TABLET, DELAYED RELEASE ORAL DAILY
Qty: 30 TABLET | Refills: 5 | Status: SHIPPED | OUTPATIENT
Start: 2024-09-23

## 2024-09-23 RX ORDER — SODIUM CHLORIDE, SODIUM LACTATE, POTASSIUM CHLORIDE, CALCIUM CHLORIDE 600; 310; 30; 20 MG/100ML; MG/100ML; MG/100ML; MG/100ML
INJECTION, SOLUTION INTRAVENOUS CONTINUOUS PRN
Status: DISCONTINUED | OUTPATIENT
Start: 2024-09-23 | End: 2024-09-23 | Stop reason: SURG

## 2024-09-23 RX ORDER — LIDOCAINE HYDROCHLORIDE 20 MG/ML
INJECTION, SOLUTION EPIDURAL; INFILTRATION; INTRACAUDAL; PERINEURAL AS NEEDED
Status: DISCONTINUED | OUTPATIENT
Start: 2024-09-23 | End: 2024-09-23 | Stop reason: SURG

## 2024-09-23 RX ORDER — SODIUM CHLORIDE, SODIUM LACTATE, POTASSIUM CHLORIDE, CALCIUM CHLORIDE 600; 310; 30; 20 MG/100ML; MG/100ML; MG/100ML; MG/100ML
30 INJECTION, SOLUTION INTRAVENOUS CONTINUOUS
Status: DISCONTINUED | OUTPATIENT
Start: 2024-09-23 | End: 2024-09-23 | Stop reason: HOSPADM

## 2024-09-23 RX ORDER — PROPOFOL 10 MG/ML
VIAL (ML) INTRAVENOUS AS NEEDED
Status: DISCONTINUED | OUTPATIENT
Start: 2024-09-23 | End: 2024-09-23 | Stop reason: SURG

## 2024-09-23 RX ADMIN — SODIUM CHLORIDE, POTASSIUM CHLORIDE, SODIUM LACTATE AND CALCIUM CHLORIDE: 600; 310; 30; 20 INJECTION, SOLUTION INTRAVENOUS at 07:33

## 2024-09-23 RX ADMIN — PROPOFOL 70 MG: 10 INJECTION, EMULSION INTRAVENOUS at 07:35

## 2024-09-23 RX ADMIN — SODIUM CHLORIDE, POTASSIUM CHLORIDE, SODIUM LACTATE AND CALCIUM CHLORIDE 30 ML/HR: 600; 310; 30; 20 INJECTION, SOLUTION INTRAVENOUS at 06:24

## 2024-09-23 RX ADMIN — PROPOFOL 30 MG: 10 INJECTION, EMULSION INTRAVENOUS at 07:37

## 2024-09-23 RX ADMIN — LIDOCAINE HYDROCHLORIDE 100 MG: 20 INJECTION, SOLUTION INTRAVENOUS at 07:35

## 2024-09-23 RX ADMIN — PROPOFOL 200 MCG/KG/MIN: 10 INJECTION, EMULSION INTRAVENOUS at 07:35

## 2024-09-24 LAB
CYTO UR: NORMAL
LAB AP CASE REPORT: NORMAL
LAB AP CLINICAL INFORMATION: NORMAL
PATH REPORT.FINAL DX SPEC: NORMAL
PATH REPORT.GROSS SPEC: NORMAL

## 2024-09-26 ENCOUNTER — TELEPHONE (OUTPATIENT)
Dept: GASTROENTEROLOGY | Facility: CLINIC | Age: 59
End: 2024-09-26
Payer: COMMERCIAL

## 2024-10-24 ENCOUNTER — OFFICE VISIT (OUTPATIENT)
Dept: GASTROENTEROLOGY | Facility: CLINIC | Age: 59
End: 2024-10-24
Payer: COMMERCIAL

## 2024-10-24 VITALS
SYSTOLIC BLOOD PRESSURE: 136 MMHG | HEIGHT: 61 IN | WEIGHT: 142 LBS | OXYGEN SATURATION: 100 % | DIASTOLIC BLOOD PRESSURE: 76 MMHG | BODY MASS INDEX: 26.81 KG/M2 | HEART RATE: 87 BPM

## 2024-10-24 DIAGNOSIS — K29.70 GASTRITIS WITHOUT BLEEDING, UNSPECIFIED CHRONICITY, UNSPECIFIED GASTRITIS TYPE: ICD-10-CM

## 2024-10-24 DIAGNOSIS — K20.80 LOS ANGELES GRADE B ESOPHAGITIS: Primary | ICD-10-CM

## 2024-10-24 RX ORDER — OXCARBAZEPINE 600 MG/1
TABLET, FILM COATED ORAL
COMMUNITY
Start: 2024-09-26

## 2024-10-24 RX ORDER — PANTOPRAZOLE SODIUM 40 MG/1
40 TABLET, DELAYED RELEASE ORAL 2 TIMES DAILY
Qty: 60 TABLET | Refills: 1 | Status: SHIPPED | OUTPATIENT
Start: 2024-10-24

## 2024-10-24 RX ORDER — HYDROXYZINE PAMOATE 25 MG/1
CAPSULE ORAL
COMMUNITY
Start: 2024-09-26

## 2024-10-24 RX ORDER — DIAZEPAM 5 MG
1 TABLET ORAL DAILY PRN
COMMUNITY

## 2024-10-24 NOTE — PROGRESS NOTES
Chief Complaint  EGD follow up ,  Epigastric pain (Primary), Heartburn, and Nausea    Izabela Shipman is a 58 y.o. female who presents to [facility] for EGD follow up.     History of present Illness  Patient hospitalized at [place] for epigastric pain  EGD 3/5/2024 by Dr. Odell - normal esophagus, moderate pylorus stenosis, and dilation performed to 21mm with resolution of liminal narrowing    Established care 9/19/2024 for epigastric pain. Patient previously had resolution of epigastric pain after pyloric stenosis but had return os symptoms about 1 month later. She has tenderness across upper abdomen and frequent breakthrough heartburn despite omeprazole and pepcid. Family history of colon cancer in her mother. She has appointment with [facility]ino in November to schedule screening colonoscopy. Started on Protonix 40mg daily.   EGD 9/23/24 by Dr. 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 Esophageal biopsies show mild reactive changes. Stomach biopsies show reactive gastropathy.      Patient presents to the office for follow up. She has continue Protonix 40mg daily but still struggles with epigastric discomfort and pill dysphagia. Previously had improvement in symptoms with Protonix 40mg BID. Denies nausea and vomiting. Denies upper GI symptoms.     Past Medical History:   Diagnosis Date    Acid reflux     Arthritis     Decreased ROM of intervertebral discs of cervical spine     due to neck surgery    Dental contusion     High cholesterol     Multiple gastric ulcers     TOMMY on CPAP 05/15/2023    Sleep disorder     Stress incontinence     Thyroid disease        Past Surgical History:   Procedure Laterality Date    ABDOMINAL SURGERY      COLONOSCOPY      ENDOSCOPY N/A 9/23/2024    Procedure: ESOPHAGOGASTRODUODENOSCOPY WITH BIOPSIES, POLYPECTOMY;  Surgeon:  Hesham Mckeon MD;  Location: [facility] ENDOSCOPY;  Service: Gastroenterology;  Laterality: N/A;  reflux esophagitis, hiatal hernia, erosive gastritis, gastric polyps    EXPLORATORY LAPAROTOMY      HYSTERECTOMY      MOUTH SURGERY      NECK SURGERY      PUBOVAGINAL SLING N/A 12/13/2021    Procedure: MID-URETHRAL SLING WITH CYSTOSCOPY;  Surgeon: Bre Buckner MD;  Location: [facility] MAIN OR;  Service: Urology;  Laterality: N/A;    ROTATOR CUFF REPAIR Right     UPPER GASTROINTESTINAL ENDOSCOPY           Current Outpatient Medications:     Adderall XR 30 MG 24 hr capsule, Take 1 capsule by mouth Every Morning, Disp: , Rfl:     amphetamine-dextroamphetamine (ADDERALL) 10 MG tablet, TAKE 1 TABLET BY MOUTH TWO TIMES A DAY AS DIRECTED, Disp: , Rfl:     desvenlafaxine (PRISTIQ) 50 MG 24 hr tablet, Take 1 tablet by mouth Daily., Disp: , Rfl:     diazePAM (VALIUM) 5 MG tablet, Take 1 tablet by mouth Daily As Needed., Disp: , Rfl:     Diclofenac Sodium (VOLTAREN) 1 % gel gel, 4 g Daily As Needed., Disp: , Rfl:     hydrOXYzine pamoate (VISTARIL) 25 MG capsule, TAKE 1 CAPSULE BY MOUTH EVERY 6 HOURS AS NEEDED FOR ANXIETY, Disp: , Rfl:     Levoxyl 100 MCG tablet, Take 125 mcg by mouth Daily., Disp: , Rfl:     OXcarbazepine (TRILEPTAL) 600 MG tablet, TAKE 1 TABLET BY MOUTH EVERY MORNING AND EVERY EVENING, Disp: , Rfl:     oxyCODONE-acetaminophen (PERCOCET)  MG per tablet, oxycodone-acetaminophen 10 mg-325 mg tablet  TAKE 1 TABLET BY MOUTH EVERY 8 HOURS AS NEEDED FOR 30 DAYS, Disp: , Rfl:     pantoprazole (PROTONIX) 40 MG EC tablet, Take 1 tablet by mouth 2 (Two) Times a Day., Disp: 60 tablet, Rfl: 1    rosuvastatin (CRESTOR) 40 MG tablet, Take 1 tablet by mouth Daily., Disp: , Rfl:     tiZANidine (ZANAFLEX) 4 MG tablet, TAKE 1 TABLET BY MOUTH EVERY 8 HOURS AS NEEDED FOR 30 DAYS, Disp: , Rfl:     albuterol sulfate  (90 Base) MCG/ACT inhaler, Inhale 2 puffs Every 6 (Six) Hours As Needed. (Patient not taking:  "Reported on 10/24/2024), Disp: , Rfl:     nicotine (NICODERM CQ) 21 MG/24HR patch, APPLY 1 PATCH TO SKIN EVERY DAY (Patient not taking: Reported on 10/24/2024), Disp: , Rfl:      Allergies   Allergen Reactions    Nsaids Unknown - High Severity     Has ulcers     Pregabalin Unknown - Low Severity, Unknown - High Severity and Other (See Comments)     Other reaction(s): Unknown  Other reaction(s): Unknown  Other reaction(s): Unknown - High Severity  Other reaction(s): Unknown         Family History   Problem Relation Age of Onset    Colon cancer Mother 52    Cancer Mother         Colon cancer    Thyroid disease Mother     Ulcers Father     Arthritis Brother     Diabetes Brother     Heart disease Other     Thyroid disease Other         Social History     Social History Narrative    Not on file       Objective       Vital Signs:   /76 (BP Location: Left arm, Patient Position: Sitting, Cuff Size: Adult)   Pulse 87   Ht 154.9 cm (60.98\")   Wt 64.4 kg (142 lb)   SpO2 100%   BMI 26.84 kg/m²       Physical Exam  Constitutional:       Appearance: Normal appearance. She is normal weight.   HENT:      Head: Normocephalic and atraumatic.      Nose: Nose normal.   Pulmonary:      Effort: Pulmonary effort is normal.   Skin:     General: Skin is warm and dry.   Neurological:      Mental Status: She is alert and oriented to person, place, and time. Mental status is at baseline.   Psychiatric:         Mood and Affect: Mood normal.         Behavior: Behavior normal.         Thought Content: Thought content normal.         Judgment: Judgment normal.         Result Review :       CBC w/diff          3/5/2024    03:00 3/6/2024    04:29 8/22/2024    14:18   CBC w/Diff   WBC 9.90     7.98     7.69    RBC 3.97     3.85     4.68    Hemoglobin 10.8     10.5     13.0    Hematocrit 34.1     32.9     40.2    MCV 85.9     85.5     85.9    MCH 27.2     27.3     27.8    MCHC 31.7     31.9     32.3    RDW 14.9     14.6     14.0  "   Platelets 281     274     347    Neutrophil Rel % 64.7     51.0     53.6    Immature Granulocyte Rel % 0.2     0.3     0.4    Lymphocyte Rel % 23.1     33.1     33.4    Monocyte Rel % 9.9     12.7     9.6    Eosinophil Rel % 1.7     2.4     2.1    Basophil Rel % 0.4     0.5     0.9       Details          This result is from an external source.                       Assessment and Plan    Diagnoses and all orders for this visit:    1. [place] grade B esophagitis (Primary)    2. Gastritis without bleeding, unspecified chronicity, unspecified gastritis type    Other orders  -     pantoprazole (PROTONIX) 40 MG EC tablet; Take 1 tablet by mouth 2 (Two) Times a Day.  Dispense: 60 tablet; Refill: 1    Reviewed most recent EGD and pathology report.   Increase Protonix 40mg BID for the next 4-6 weeks then wean back to once a day  If symptoms persist despite increased dosing may consider switching to Voquezna    Follow Up   Return in about 3 months (around 1/24/2025).  Patient was given instructions and counseling regarding her condition or for health maintenance advice. Please see specific information pulled into the AVS if appropriate.

## 2024-11-05 ENCOUNTER — OFFICE VISIT (OUTPATIENT)
Dept: SURGERY | Facility: CLINIC | Age: 59
End: 2024-11-05
Payer: COMMERCIAL

## 2024-11-05 ENCOUNTER — PREP FOR SURGERY (OUTPATIENT)
Dept: OTHER | Facility: HOSPITAL | Age: 59
End: 2024-11-05
Payer: COMMERCIAL

## 2024-11-05 VITALS
OXYGEN SATURATION: 97 % | BODY MASS INDEX: 26.47 KG/M2 | DIASTOLIC BLOOD PRESSURE: 71 MMHG | HEART RATE: 77 BPM | WEIGHT: 140.21 LBS | HEIGHT: 61 IN | SYSTOLIC BLOOD PRESSURE: 121 MMHG

## 2024-11-05 DIAGNOSIS — Z80.0 FAMILY HISTORY OF COLON CANCER: ICD-10-CM

## 2024-11-05 DIAGNOSIS — Z86.0100 HISTORY OF COLONIC POLYPS: ICD-10-CM

## 2024-11-05 DIAGNOSIS — Z12.11 SCREENING FOR MALIGNANT NEOPLASM OF COLON: Primary | ICD-10-CM

## 2024-11-05 RX ORDER — SODIUM CHLORIDE 0.9 % (FLUSH) 0.9 %
10 SYRINGE (ML) INJECTION AS NEEDED
OUTPATIENT
Start: 2024-11-05

## 2024-11-05 RX ORDER — SODIUM CHLORIDE 0.9 % (FLUSH) 0.9 %
3 SYRINGE (ML) INJECTION EVERY 12 HOURS SCHEDULED
OUTPATIENT
Start: 2024-11-05

## 2024-11-05 RX ORDER — POLYETHYLENE GLYCOL 3350 17 G/17G
POWDER, FOR SOLUTION ORAL
Qty: 238 PACKET | Refills: 0 | Status: SHIPPED | OUTPATIENT
Start: 2024-11-05

## 2024-11-05 RX ORDER — LEVOTHYROXINE SODIUM 125 UG/1
125 TABLET ORAL DAILY
COMMUNITY
Start: 2024-11-04 | End: 2025-02-02

## 2024-11-05 RX ORDER — SODIUM CHLORIDE 9 MG/ML
40 INJECTION, SOLUTION INTRAVENOUS AS NEEDED
OUTPATIENT
Start: 2024-11-05

## 2024-11-05 RX ORDER — DESVENLAFAXINE 100 MG/1
1 TABLET, EXTENDED RELEASE ORAL DAILY
COMMUNITY
Start: 2024-11-03

## 2024-11-05 NOTE — PROGRESS NOTES
Chief Complaint: Colonoscopy    Subjective      Colonoscopy consultation       History of Present Illness  Izabela Shipman is a 58 y.o. female presents to [facility] GENERAL SURGERY for colonoscopy consultation.    Patient presents today without complaints for screening colonoscopy.  She denies any abdominal pain, change in bowel habit, or rectal bleeding.  Admits to family history of colon cancer with her mother and 2 maternal uncles.    Admits to TOMMY.  Patient is scheduled to get her CPAP this week.    Denies any cardiac issues.  Denies taking any GLP-1 receptors.    9/24: egd (Mike): fundic gland polyp; gastritis.     5/21: egd & colonoscopy (Clark): hiatal hernia; duodenitis; transverse- tubular adenoma;     3/18: EGD & Colonoscopy (Shlomo): Erosive esophagitis; gastritis; normal colon.     3/16: EGD (Shlomo): gastritis; duodenitis.    1/15: EGD & Colonoscopy (Shlomo); Reflux esophagitis; gastritis; hiatal hernia; normal colon.     5/11: EGD (Shlomo); H. pylori gastritis.    12/07: Colonoscopy (Shlomo); normal colon.     4/02: Colonoscopy (Shlomo); normal colon; proctitis; internal hemorrhoids.         Objective     Past Medical History:   Diagnosis Date    Acid reflux     Arthritis     Colon polyp 2000    Decreased ROM of intervertebral discs of cervical spine     due to neck surgery    Dental contusion     High cholesterol     Multiple gastric ulcers     TOMMY on CPAP 05/15/2023    Sleep disorder     Stress incontinence     Thyroid disease     Thyroid nodule 2023       Past Surgical History:   Procedure Laterality Date    ABDOMINAL SURGERY      COLONOSCOPY      ENDOSCOPY N/A 9/23/2024    Procedure: ESOPHAGOGASTRODUODENOSCOPY WITH BIOPSIES, POLYPECTOMY;  Surgeon: Hesham Mckeon MD;  Location: [facility] ENDOSCOPY;  Service: Gastroenterology;  Laterality: N/A;  reflux esophagitis, hiatal hernia, erosive gastritis, gastric polyps    EXPLORATORY LAPAROTOMY      HYSTERECTOMY      MOUTH SURGERY       NECK SURGERY      PUBOVAGINAL SLING N/A 12/13/2021    Procedure: MID-URETHRAL SLING WITH CYSTOSCOPY;  Surgeon: Bre Buckner MD;  Location: [facility] MAIN OR;  Service: Urology;  Laterality: N/A;    ROTATOR CUFF REPAIR Right     UPPER GASTROINTESTINAL ENDOSCOPY         Outpatient Medications Marked as Taking for the 11/5/24 encounter (Office Visit) with Belen Galo APRN   Medication Sig Dispense Refill    Adderall XR 30 MG 24 hr capsule Take 1 capsule by mouth Every Morning      desvenlafaxine (PRISTIQ) 100 MG 24 hr tablet Take 1 tablet by mouth Daily.      Diclofenac Sodium (VOLTAREN) 1 % gel gel 4 g Daily As Needed.      hydrOXYzine pamoate (VISTARIL) 25 MG capsule TAKE 1 CAPSULE BY MOUTH EVERY 6 HOURS AS NEEDED FOR ANXIETY      Levoxyl 125 MCG tablet Take 1 tablet by mouth Daily.      nicotine (NICODERM CQ) 21 MG/24HR patch       OXcarbazepine (TRILEPTAL) 600 MG tablet TAKE 1 TABLET BY MOUTH EVERY MORNING AND EVERY EVENING      oxyCODONE-acetaminophen (PERCOCET)  MG per tablet oxycodone-acetaminophen 10 mg-325 mg tablet   TAKE 1 TABLET BY MOUTH EVERY 8 HOURS AS NEEDED FOR 30 DAYS      pantoprazole (PROTONIX) 40 MG EC tablet Take 1 tablet by mouth 2 (Two) Times a Day. 60 tablet 1    rosuvastatin (CRESTOR) 40 MG tablet Take 1 tablet by mouth Daily.      tiZANidine (ZANAFLEX) 4 MG tablet TAKE 1 TABLET BY MOUTH EVERY 8 HOURS AS NEEDED FOR 30 DAYS         Allergies   Allergen Reactions    Nsaids Unknown - High Severity     Has ulcers     Pregabalin Unknown - Low Severity, Unknown - High Severity and Other (See Comments)     Other reaction(s): Unknown  Other reaction(s): Unknown  Other reaction(s): Unknown - High Severity  Other reaction(s): Unknown          Family History   Problem Relation Age of Onset    Colon cancer Mother 52    Cancer Mother         Colon cancer    Thyroid disease Mother     Ulcers Father     Arthritis Brother     Diabetes Brother     Heart disease Other     Thyroid disease Other   "      Social History     Socioeconomic History    Marital status:    Tobacco Use    Smoking status: Every Day     Current packs/day: 0.00     Average packs/day: 0.3 packs/day for 33.9 years (8.5 ttl pk-yrs)     Types: Cigarettes     Start date: 1986     Last attempt to quit: 2020     Years since quittin.4     Passive exposure: Past    Smokeless tobacco: Never    Tobacco comments:     trying to quit down to 5 cig a day , hasnt smoked in 24 hours   Vaping Use    Vaping status: Never Used   Substance and Sexual Activity    Alcohol use: Never    Drug use: Never    Sexual activity: Not Currently     Birth control/protection: Hysterectomy       Review of Systems   Constitutional:  Negative for chills and fever.   Gastrointestinal:  Negative for abdominal distention, abdominal pain, anal bleeding, blood in stool, constipation, diarrhea and rectal pain.        Vital Signs:   /71 (BP Location: Right arm, Patient Position: Sitting, Cuff Size: Adult)   Pulse 77   Ht 154.9 cm (60.98\")   Wt 63.6 kg (140 lb 3.4 oz)   SpO2 97%   BMI 26.51 kg/m²      Physical Exam  Vitals and nursing note reviewed.   Constitutional:       General: She is not in acute distress.     Appearance: Normal appearance. She is not ill-appearing.   HENT:      Head: Normocephalic and atraumatic.   Cardiovascular:      Rate and Rhythm: Normal rate.   Pulmonary:      Effort: Pulmonary effort is normal.   Abdominal:      Palpations: Abdomen is soft.      Tenderness: There is no guarding.   Musculoskeletal:         General: No deformity. Normal range of motion.   Skin:     General: Skin is warm and dry.      Coloration: Skin is not jaundiced.   Neurological:      General: No focal deficit present.      Mental Status: She is alert and oriented to person, place, and time.   Psychiatric:         Mood and Affect: Mood normal.         Thought Content: Thought content normal.          Result Review :          []  Laboratory  []  " Radiology  []  Pathology  []  Microbiology  []  EKG/Telemetry   []  Cardiology/Vascular   []  Old records  I spent 15 minutes caring for Izabela on this date of service. This time includes time spent by me in the following activities: reviewing tests, obtaining and/or reviewing a separately obtained history, performing a medically appropriate examination and/or evaluation, ordering medications, tests, or procedures, and documenting information in the medical record        Assessment and Plan    Diagnoses and all orders for this visit:    1. Screening for malignant neoplasm of colon (Primary)    2. History of colonic polyps    3. Family history of colon cancer    Other orders  -     polyethylene glycol (MIRALAX) 17 g packet; Take as directed.  Instructions given in office.  Dispense: 238 g bottle  Dispense: 238 packet; Refill: 0        Follow Up   Return for Schedule colonoscopy with Dr. Clark on 12/11/2024 [facility].    Hospital arrival time: 12:30.    Possible risks/complications, benefits, and alternatives to surgical or invasive procedures have been explained to patient and/or legal guardian.    Patient has been evaluated and can tolerate anesthesia and/or sedation. Risks, benefits, and alternatives to anesthesia and sedation have been explained to the patient and/or legal guardian. Patient verbalizes understanding and is willing to proceed with the above plan.     Patient was given instructions and counseling regarding her condition or for health maintenance advice. Please see specific information pulled into the AVS if appropriate.     Thank you for allowing me to participate in the care of this patient. Please call with questions or concerns.

## 2024-12-10 ENCOUNTER — ANESTHESIA EVENT (OUTPATIENT)
Dept: GASTROENTEROLOGY | Facility: HOSPITAL | Age: 59
End: 2024-12-10
Payer: COMMERCIAL

## 2024-12-10 NOTE — ANESTHESIA PREPROCEDURE EVALUATION
Anesthesia Evaluation     NPO Solid Status: > 8 hours  NPO Liquid Status: > 4 hours           Airway   Mallampati: II  TM distance: >3 FB  Neck ROM: limited  No difficulty expected  Comment: Limited ROM - previous spinal surgery with hardware   Dental - normal exam   (+) upper dentures    Pulmonary - normal exam    breath sounds clear to auscultation  (+) a smoker Current, Abstained day of surgery, cigarettes,sleep apnea on CPAP  Cardiovascular - normal exam    Rhythm: regular  Rate: normal    (+) hyperlipidemia    ROS comment:     EKG 06/2022  - NORMAL ECG -  Sinus rhythm  When compared with ECG of 13-Dec-2021 9:30:03,  No significant change  Electronically Signed By: Luther Sandra (JACOB) 06-Oct-2022 11:25:52      Neuro/Psych  (+) numbness, psychiatric history Anxiety  GI/Hepatic/Renal/Endo    (+) GERD well controlled, PUD, thyroid problem hyperthyroidism and thyroid nodules    Musculoskeletal     (+) neck pain (previous cervical spine surgery)  Abdominal  - normal exam   Substance History      OB/GYN      Comment: S/P HYSTERECTOMY        Other   arthritis,     ROS/Med Hx Other:    EKG 10/03/22: HR 75, SR                Anesthesia Plan    ASA 2     general   total IV anesthesia  (Total IV Anesthesia    Patient understands anesthesia not responsible for dental damage.  )  intravenous induction     Anesthetic plan, risks, benefits, and alternatives have been provided, discussed and informed consent has been obtained with: patient.  Pre-procedure education provided  Use of blood products discussed with patient  Consented to blood products.    Plan discussed with CRNA.    CODE STATUS:

## 2024-12-11 ENCOUNTER — ANESTHESIA (OUTPATIENT)
Dept: GASTROENTEROLOGY | Facility: HOSPITAL | Age: 59
End: 2024-12-11
Payer: COMMERCIAL

## 2024-12-11 ENCOUNTER — HOSPITAL ENCOUNTER (OUTPATIENT)
Facility: HOSPITAL | Age: 59
Setting detail: HOSPITAL OUTPATIENT SURGERY
Discharge: HOME | End: 2024-12-11
Attending: SURGERY | Admitting: SURGERY
Payer: COMMERCIAL

## 2024-12-11 VITALS
HEART RATE: 74 BPM | TEMPERATURE: 98.1 F | DIASTOLIC BLOOD PRESSURE: 53 MMHG | WEIGHT: 139.99 LBS | SYSTOLIC BLOOD PRESSURE: 95 MMHG | RESPIRATION RATE: 19 BRPM | OXYGEN SATURATION: 95 % | BODY MASS INDEX: 26.47 KG/M2

## 2024-12-11 DIAGNOSIS — Z86.0100 HISTORY OF COLONIC POLYPS: ICD-10-CM

## 2024-12-11 DIAGNOSIS — Z80.0 FAMILY HISTORY OF COLON CANCER: ICD-10-CM

## 2024-12-11 DIAGNOSIS — Z12.11 SCREENING FOR MALIGNANT NEOPLASM OF COLON: ICD-10-CM

## 2024-12-11 PROCEDURE — 25010000002 PROPOFOL 10 MG/ML EMULSION: Performed by: MARRIAGE & FAMILY THERAPIST

## 2024-12-11 PROCEDURE — 88305 TISSUE EXAM BY PATHOLOGIST: CPT | Performed by: SURGERY

## 2024-12-11 PROCEDURE — 25010000002 LIDOCAINE PF 2% 2 % SOLUTION: Performed by: MARRIAGE & FAMILY THERAPIST

## 2024-12-11 RX ORDER — SODIUM CHLORIDE 9 MG/ML
40 INJECTION, SOLUTION INTRAVENOUS AS NEEDED
Status: DISCONTINUED | OUTPATIENT
Start: 2024-12-11 | End: 2024-12-11 | Stop reason: HOSPADM

## 2024-12-11 RX ORDER — SODIUM CHLORIDE 0.9 % (FLUSH) 0.9 %
3 SYRINGE (ML) INJECTION EVERY 12 HOURS SCHEDULED
Status: DISCONTINUED | OUTPATIENT
Start: 2024-12-11 | End: 2024-12-11 | Stop reason: HOSPADM

## 2024-12-11 RX ORDER — SODIUM CHLORIDE, SODIUM LACTATE, POTASSIUM CHLORIDE, CALCIUM CHLORIDE 600; 310; 30; 20 MG/100ML; MG/100ML; MG/100ML; MG/100ML
30 INJECTION, SOLUTION INTRAVENOUS CONTINUOUS
Status: DISCONTINUED | OUTPATIENT
Start: 2024-12-11 | End: 2024-12-11 | Stop reason: HOSPADM

## 2024-12-11 RX ORDER — LIDOCAINE HYDROCHLORIDE 20 MG/ML
INJECTION, SOLUTION EPIDURAL; INFILTRATION; INTRACAUDAL; PERINEURAL AS NEEDED
Status: DISCONTINUED | OUTPATIENT
Start: 2024-12-11 | End: 2024-12-11 | Stop reason: SURG

## 2024-12-11 RX ORDER — SODIUM CHLORIDE 0.9 % (FLUSH) 0.9 %
10 SYRINGE (ML) INJECTION AS NEEDED
Status: DISCONTINUED | OUTPATIENT
Start: 2024-12-11 | End: 2024-12-11 | Stop reason: HOSPADM

## 2024-12-11 RX ORDER — PROPOFOL 10 MG/ML
VIAL (ML) INTRAVENOUS AS NEEDED
Status: DISCONTINUED | OUTPATIENT
Start: 2024-12-11 | End: 2024-12-11 | Stop reason: SURG

## 2024-12-11 RX ADMIN — PROPOFOL 125 MCG/KG/MIN: 10 INJECTION, EMULSION INTRAVENOUS at 14:54

## 2024-12-11 RX ADMIN — PROPOFOL 50 MG: 10 INJECTION, EMULSION INTRAVENOUS at 14:53

## 2024-12-11 RX ADMIN — LIDOCAINE HYDROCHLORIDE 50 MG: 20 INJECTION, SOLUTION INTRAVENOUS at 14:53

## 2024-12-11 NOTE — H&P
Chief Complaint: Colonoscopy    Patient is here to have a colonoscopy.  Following is a copy of the HPI from the patient's office visit at the surgery clinic.    History of Present Illness  Izabela Shipman is a 58 y.o. female presents to [facility] GENERAL SURGERY for colonoscopy consultation.    Patient presents today without complaints for screening colonoscopy.  She denies any abdominal pain, change in bowel habit, or rectal bleeding.  Admits to family history of colon cancer with her mother and 2 maternal uncles.    Admits to TOMMY.  Patient is scheduled to get her CPAP this week.    Denies any cardiac issues.  Denies taking any GLP-1 receptors.    9/24: egd (Mike): fundic gland polyp; gastritis.     5/21: egd & colonoscopy (Clark): hiatal hernia; duodenitis; transverse- tubular adenoma;     3/18: EGD & Colonoscopy (Shlomo): Erosive esophagitis; gastritis; normal colon.     3/16: EGD (Shlomo): gastritis; duodenitis.    1/15: EGD & Colonoscopy (Shlomo); Reflux esophagitis; gastritis; hiatal hernia; normal colon.     5/11: EGD (Shlomo); H. pylori gastritis.    12/07: Colonoscopy (Shlomo); normal colon.     4/02: Colonoscopy (Shlomo); normal colon; proctitis; internal hemorrhoids.         Objective     Past Medical History:   Diagnosis Date    Acid reflux     Arthritis     Colon polyp 2000    Decreased ROM of intervertebral discs of cervical spine     due to neck surgery    Dental contusion     High cholesterol     Multiple gastric ulcers     TOMMY on CPAP 05/15/2023    Sleep disorder     Stress incontinence     Thyroid disease     Thyroid nodule 2023       Past Surgical History:   Procedure Laterality Date    ABDOMINAL SURGERY      COLONOSCOPY      ENDOSCOPY N/A 9/23/2024    Procedure: ESOPHAGOGASTRODUODENOSCOPY WITH BIOPSIES, POLYPECTOMY;  Surgeon: Hesham Mckeon MD;  Location: [facility] ENDOSCOPY;  Service: Gastroenterology;  Laterality: N/A;  reflux esophagitis, hiatal hernia, erosive gastritis,  gastric polyps    EXPLORATORY LAPAROTOMY      HYSTERECTOMY      MOUTH SURGERY      NECK SURGERY      PUBOVAGINAL SLING N/A 12/13/2021    Procedure: MID-URETHRAL SLING WITH CYSTOSCOPY;  Surgeon: Bre Buckner MD;  Location: [facility] MAIN OR;  Service: Urology;  Laterality: N/A;    ROTATOR CUFF REPAIR Right     UPPER GASTROINTESTINAL ENDOSCOPY         Outpatient Medications Marked as Taking for the 11/5/24 encounter (Office Visit) with Belen Galo, TR   Medication Sig Dispense Refill    Adderall XR 30 MG 24 hr capsule Take 1 capsule by mouth Every Morning      desvenlafaxine (PRISTIQ) 100 MG 24 hr tablet Take 1 tablet by mouth Daily.      Diclofenac Sodium (VOLTAREN) 1 % gel gel 4 g Daily As Needed.      hydrOXYzine pamoate (VISTARIL) 25 MG capsule TAKE 1 CAPSULE BY MOUTH EVERY 6 HOURS AS NEEDED FOR ANXIETY      Levoxyl 125 MCG tablet Take 1 tablet by mouth Daily.      nicotine (NICODERM CQ) 21 MG/24HR patch       OXcarbazepine (TRILEPTAL) 600 MG tablet TAKE 1 TABLET BY MOUTH EVERY MORNING AND EVERY EVENING      oxyCODONE-acetaminophen (PERCOCET)  MG per tablet oxycodone-acetaminophen 10 mg-325 mg tablet   TAKE 1 TABLET BY MOUTH EVERY 8 HOURS AS NEEDED FOR 30 DAYS      pantoprazole (PROTONIX) 40 MG EC tablet Take 1 tablet by mouth 2 (Two) Times a Day. 60 tablet 1    rosuvastatin (CRESTOR) 40 MG tablet Take 1 tablet by mouth Daily.      tiZANidine (ZANAFLEX) 4 MG tablet TAKE 1 TABLET BY MOUTH EVERY 8 HOURS AS NEEDED FOR 30 DAYS         Allergies   Allergen Reactions    Nsaids Unknown - High Severity     Has ulcers     Pregabalin Unknown - Low Severity, Unknown - High Severity and Other (See Comments)     Other reaction(s): Unknown  Other reaction(s): Unknown  Other reaction(s): Unknown - High Severity  Other reaction(s): Unknown          Family History   Problem Relation Age of Onset    Colon cancer Mother 52    Cancer Mother         Colon cancer    Thyroid disease Mother     Ulcers Father     Arthritis  Brother     Diabetes Brother     Heart disease Other     Thyroid disease Other        Social History     Socioeconomic History    Marital status:    Tobacco Use    Smoking status: Every Day     Current packs/day: 0.00     Average packs/day: 0.3 packs/day for 33.9 years (8.5 ttl pk-yrs)     Types: Cigarettes     Start date: 1986     Last attempt to quit: 2020     Years since quittin.4     Passive exposure: Past    Smokeless tobacco: Never    Tobacco comments:     trying to quit down to 5 cig a day , hasnt smoked in 24 hours   Vaping Use    Vaping status: Never Used   Substance and Sexual Activity    Alcohol use: Never    Drug use: Never    Sexual activity: Not Currently     Birth control/protection: Hysterectomy     Physical Exam  Vitals - Available in the EMR.   Respiratory:  breathing not labored, respiratory effort appears normal  Cardiovascular:  heart regular rate  Skin and subcutaneous tissue:  warm and dry  Musculoskeletal: moving all extremities symmetrically and purposefully  Neurologic:  no obvious motor or sensory deficits, speech clear  Psychiatric:  judgment and insight intact, mood normal      Assessment   1. Screening for malignant neoplasm of colon  2. History of colonic polyps  3. Family history of colon cancer    Plan  Colonoscopy    Risks and benefits discussed    Dick Clark M.D.  24    Electronically signed by Dick Clark MD, 24, 11:19 AM EST.

## 2024-12-11 NOTE — ANESTHESIA POSTPROCEDURE EVALUATION
Patient: Izabela Shipman    Procedure Summary       Date: 12/11/24 Room / Location: [facility] ENDOSCOPY 1 / [facility] ENDOSCOPY    Anesthesia Start: 1452 Anesthesia Stop: 1518    Procedure: COLONOSCOPY POLYPECTOMY WITH COLD SNARE Diagnosis:       Screening for malignant neoplasm of colon      History of colonic polyps      Family history of colon cancer      (Screening for malignant neoplasm of colon [Z12.11])      (History of colonic polyps [Z86.0100])      (Family history of colon cancer [Z80.0])    Surgeons: Dick Clark MD Provider: Raheem Harman CRNA    Anesthesia Type: general ASA Status: 2            Anesthesia Type: general    Vitals  Vitals Value Taken Time   BP 95/53 12/11/24 1532   Temp 36.7 °C (98.1 °F) 12/11/24 1530   Pulse 76 12/11/24 1535   Resp 19 12/11/24 1530   SpO2 96 % 12/11/24 1535   Vitals shown include unfiled device data.        Post Anesthesia Care and Evaluation    Post-procedure mental status: acceptable.  Pain management: satisfactory to patient    Airway patency: patent  Anesthetic complications: No anesthetic complications    Cardiovascular status: acceptable  Respiratory status: acceptable    Comments: Per chart review

## 2025-01-13 ENCOUNTER — TELEPHONE (OUTPATIENT)
Dept: SURGERY | Facility: CLINIC | Age: 60
End: 2025-01-13
Payer: COMMERCIAL

## 2025-01-13 NOTE — TELEPHONE ENCOUNTER
PT WAS SEEN IN NOVEMBER FOR A COLON CONSULT. SHE HAD AN EGD IN SEPTEMBER BY DR PEREIRA. SHE SAID THAT SHE DISCUSSED HER ABDOMINAL PAIN WITH APRIL AND WAS TOLD THAT IF IT DID NOT RESOLVE TO CALL US BACK. FOR THE PAST FEW DAYS SHE HAS BEEN HAVING MID EPIGASTRIC PAIN AND WANTED TO KNOW IF SHE SHOULD BE SEEN AGAIN OR HAVE ANOTHER EGD. SHE HAS NOT REACHED OUT TO DR PEREIRA'S OFFICE AS WE WERE THE LAST OFFICE SHE HAD BEEN TO FOR HER ISSUES.

## 2025-01-14 ENCOUNTER — TELEPHONE (OUTPATIENT)
Dept: GASTROENTEROLOGY | Facility: CLINIC | Age: 60
End: 2025-01-14
Payer: COMMERCIAL

## 2025-01-14 NOTE — TELEPHONE ENCOUNTER
Patient may also try to see primary care as pain only with movement and tenderness to touch may not be a GI complaint.   We will definitely address the dysphagia at her office visit, recommend she continue Protonix and take first thing in the morning on an empty stomach

## 2025-01-14 NOTE — TELEPHONE ENCOUNTER
Called patient, she is agreeable to taking Protonix and to take first thing in the morning on an empty stomach. She also said she will see if PCP can get her in, if not she will wait for her 2.12.25 appt here.

## 2025-01-14 NOTE — TELEPHONE ENCOUNTER
Provider: RICHARDSON    Caller: Izabela Shipman    Relationship to Patient: Self    Phone Number: [phone]    Reason for Call: PAIN IN TOP OF STOMACH RIGHT UNDER BREAST IN THE MIDDLE. HAS BEEN LAYING AROUND TRYING TO GET IT TO SUBSIDE WITH NO RELIEF. INTENSE PAIN ON TOUCHING    When was the patient last seen: 10.24.24    When did it start: 2 DAYS AGO    Where is it located: UNDER BREAST    Characteristics of symptom/severity: 7/10 PAIN SCALE    What makes it worse: MOVEMENT    What makes it better: LAYING DOWN WITH HEAT PAD BUT WHEN SHE GETS UP THE PAIN CONTINUES    What therapies/medications have you tried: NO, PANTOPRAZOLE NOT WORKING ANYMORE

## 2025-01-14 NOTE — TELEPHONE ENCOUNTER
RUQ ABD pain intermittent, worse with movement, tender to touch. Pt denies having any N/V, change in bowels or blood in stool. Pt also denies heartburn/reflux. Pt has been having trouble swallowing her pills, feels like they are getting stuck   Pt is belching after taking protonix.     Advised pt that if the pain is acute or worsens to go to the ER for an urgent evaluation. Pt is agreeable.

## 2025-02-07 RX ORDER — PANTOPRAZOLE SODIUM 40 MG/1
40 TABLET, DELAYED RELEASE ORAL 2 TIMES DAILY
Qty: 60 TABLET | Refills: 0 | Status: SHIPPED | OUTPATIENT
Start: 2025-02-07

## 2025-02-07 NOTE — TELEPHONE ENCOUNTER
Pharmacy:  [facility]   Rx Refill Request:  pantoprazole 40 mg  Last ordered:  10.24.24, 60 tabs with 1 refill  Last Office Visit:  10.24.24  Next Office Visit:   02.12.25

## 2025-03-17 ENCOUNTER — TELEPHONE (OUTPATIENT)
Dept: GASTROENTEROLOGY | Facility: CLINIC | Age: 60
End: 2025-03-17
Payer: COMMERCIAL

## 2025-03-17 NOTE — TELEPHONE ENCOUNTER
Was calling patients from waitlist, Radha EATON had a appointment open today. [facility]

## 2025-03-18 ENCOUNTER — TELEPHONE (OUTPATIENT)
Dept: GASTROENTEROLOGY | Facility: CLINIC | Age: 60
End: 2025-03-18
Payer: COMMERCIAL

## 2025-03-18 NOTE — TELEPHONE ENCOUNTER
Was calling patients from waitlist, Radha EATON had a appointment open tomorrow 03.19.25. [facility]

## 2025-03-27 ENCOUNTER — TRANSCRIBE ORDERS (OUTPATIENT)
Dept: ADMINISTRATIVE | Facility: HOSPITAL | Age: 60
End: 2025-03-27
Payer: COMMERCIAL

## 2025-03-27 DIAGNOSIS — R19.7 DIARRHEA, UNSPECIFIED TYPE: Primary | ICD-10-CM

## 2025-03-31 ENCOUNTER — TELEPHONE (OUTPATIENT)
Dept: GASTROENTEROLOGY | Facility: CLINIC | Age: 60
End: 2025-03-31
Payer: COMMERCIAL

## 2025-03-31 NOTE — TELEPHONE ENCOUNTER
Was calling patients from waitlist, Radha EATON had a appointment open today. [facility]

## 2025-04-01 ENCOUNTER — TELEPHONE (OUTPATIENT)
Dept: GASTROENTEROLOGY | Facility: CLINIC | Age: 60
End: 2025-04-01
Payer: COMMERCIAL

## 2025-04-01 NOTE — TELEPHONE ENCOUNTER
Was calling patients from waitlist, Radha EATON had a appointment open today. [facility]

## 2025-04-07 ENCOUNTER — TELEPHONE (OUTPATIENT)
Dept: GASTROENTEROLOGY | Facility: CLINIC | Age: 60
End: 2025-04-07
Payer: COMMERCIAL

## 2025-04-07 NOTE — TELEPHONE ENCOUNTER
Was calling patients from waitlist, Radha EATON had a appointment open today. [facility]

## 2025-04-14 ENCOUNTER — TELEPHONE (OUTPATIENT)
Dept: GASTROENTEROLOGY | Facility: CLINIC | Age: 60
End: 2025-04-14
Payer: COMMERCIAL

## 2025-04-29 ENCOUNTER — TELEPHONE (OUTPATIENT)
Dept: GASTROENTEROLOGY | Facility: CLINIC | Age: 60
End: 2025-04-29
Payer: COMMERCIAL

## 2025-04-29 NOTE — TELEPHONE ENCOUNTER
Was calling patients from waitlist, Radha EATON had a appointment open today. [facility]

## 2025-05-02 ENCOUNTER — OFFICE VISIT (OUTPATIENT)
Dept: ORTHOPEDIC SURGERY | Facility: CLINIC | Age: 60
End: 2025-05-02
Payer: COMMERCIAL

## 2025-05-02 VITALS — HEIGHT: 60 IN | WEIGHT: 139 LBS | BODY MASS INDEX: 27.29 KG/M2

## 2025-05-02 DIAGNOSIS — M19.012 ARTHRITIS OF BOTH ACROMIOCLAVICULAR JOINTS: ICD-10-CM

## 2025-05-02 DIAGNOSIS — M25.512 LEFT SHOULDER PAIN, UNSPECIFIED CHRONICITY: ICD-10-CM

## 2025-05-02 DIAGNOSIS — M19.011 ARTHRITIS OF BOTH ACROMIOCLAVICULAR JOINTS: ICD-10-CM

## 2025-05-02 DIAGNOSIS — M25.511 RIGHT SHOULDER PAIN, UNSPECIFIED CHRONICITY: Primary | ICD-10-CM

## 2025-05-02 NOTE — PROGRESS NOTES
"Chief Complaint  Initial Evaluation of the Right Shoulder and Initial Evaluation of the Left Shoulder     Subjective      Izabela Shipman presents to [facility] ORTHOPEDICS for initial evaluation of the right shoulder.  She has pain in the right shoulder.  The left shoulder is worse then the right.  The right has been painful for a year and the left for about 6 months.  She has had no recent injury or fall.  She has a history of a right shoulder arthroscopy.     Allergies   Allergen Reactions    Nsaids Unknown - High Severity     Has ulcers     Pregabalin Unknown - Low Severity, Unknown - High Severity and Other (See Comments)     Other reaction(s): Unknown  Other reaction(s): Unknown  Other reaction(s): Unknown - High Severity  Other reaction(s): Unknown          Social History     Socioeconomic History    Marital status:    Tobacco Use    Smoking status: Some Days     Current packs/day: 0.00     Average packs/day: 0.3 packs/day for 33.9 years (8.5 ttl pk-yrs)     Types: Cigarettes     Start date: 1986     Last attempt to quit: 2020     Years since quittin.8     Passive exposure: Past    Smokeless tobacco: Never    Tobacco comments:     trying to quit down to 5 cig a day , hasnt smoked in 24 hours   Vaping Use    Vaping status: Never Used   Substance and Sexual Activity    Alcohol use: Never    Drug use: Never    Sexual activity: Not Currently     Partners: Male     Birth control/protection: Hysterectomy        I reviewed the patient's chief complaint, history of present illness, review of systems, past medical history, surgical history, family history, social history, medications, and allergy list.     Review of Systems     Constitutional: Denies fevers, chills, weight loss  Cardiovascular: Denies chest pain, shortness of breath  Skin: Denies rashes, acute skin changes  Neurologic: Denies headache, loss of consciousness        Vital Signs:   Ht 152.4 cm (60\")   Wt 63 kg " (139 lb)   BMI 27.15 kg/m²          Physical Exam  General: Alert. No acute distress    Ortho Exam      BILATERAL SHOULDERS Forward flexion 120 AROM 180 PROM . Abduction 90. External rotation 50. Internal rotation SI joint. . Positive Cross body adduction. Supraspinatus strength 4/5. Infraspinatus Strength 4+/5. Infrared subscap 4+/5. Positive Monte. Positive Neer. Negative Apprehension. Negative Lift off. (Negative Obriens. Sensation intact to light touch, median, radial, ulnar nerve. Positive AIN, PIN, ulnar nerve motor. Positive pulses. Positive Impingement signs. Good strength in triceps, biceps, deltoid, wrist extensors and wrist flexors. Tender to palpation to  the shoulder and down the arm.  Pain with empty can testing.       Procedures        Imaging Results (Most Recent)       Procedure Component Value Units Date/Time    XR Scapula Right [[id number]] Resulted: 05/02/25 1620     Updated: 05/02/25 1623    XR Scapula Left [[id number]] Resulted: 05/02/25 1621     Updated: 05/02/25 1623             Result Review :     X-Ray Report:  Right scapula X-Ray  Indication: Evaluation of the right scapula  AP/Lateral view(s)  Findings: Mild to moderate AC joint arthritis.    Prior studies available for comparison: no     X-Ray Report:  Left scapula X-Ray  Indication: Evaluation of the left scapula  AP/Lateral view(s)  Findings: Mild AC joint arthritis.    Prior studies available for comparison: no        Assessment and Plan     Diagnoses and all orders for this visit:    1. Right shoulder pain, unspecified chronicity (Primary)  -     XR Scapula Right  -     MRI Shoulder Right Without Contrast; Future    2. Left shoulder pain, unspecified chronicity  -     XR Scapula Left    3. Arthritis of both acromioclavicular joints        Discussed the treatment plan with the patient. I reviewed the X-rays that were obtained today with the patient.     MRI of the right shoulder to assess the structure.        Educated on risk of  smoking/nicotine. Discussed options for smoking cessation regarding chantix, nicorette gum and/ or to call the quit hotline at [phone]  and Call or return if worsening symptoms.    Follow Up     After MRI of the right shoulder.        Patient was given instructions and counseling regarding her condition or for health maintenance advice. Please see specific information pulled into the AVS if appropriate.     Scribed for Nash Bautista MD by Dory Crooks MA.  05/02/25   16:30 EDT    I have personally performed the services described in this document as scribed by the above individual and it is both accurate and complete. Nash Bautista MD 05/02/25

## 2025-06-20 ENCOUNTER — TRANSCRIBE ORDERS (OUTPATIENT)
Dept: ADMINISTRATIVE | Facility: HOSPITAL | Age: 60
End: 2025-06-20
Payer: COMMERCIAL

## 2025-06-20 DIAGNOSIS — Z12.31 ENCOUNTER FOR SCREENING MAMMOGRAM FOR MALIGNANT NEOPLASM OF BREAST: Primary | ICD-10-CM

## 2025-07-30 RX ORDER — PANTOPRAZOLE SODIUM 40 MG/1
40 TABLET, DELAYED RELEASE ORAL 2 TIMES DAILY
Qty: 60 TABLET | Refills: 0 | Status: SHIPPED | OUTPATIENT
Start: 2025-07-30

## 2025-07-30 NOTE — TELEPHONE ENCOUNTER
Refill request for pantoprazole, order pended.  Last o/v-10/24/24  Last refill-2/7/25  Next o/v-8/20/25

## 2025-08-20 ENCOUNTER — TELEPHONE (OUTPATIENT)
Dept: GASTROENTEROLOGY | Facility: CLINIC | Age: 60
End: 2025-08-20

## 2025-08-20 ENCOUNTER — SPECIALTY PHARMACY (OUTPATIENT)
Dept: OTHER | Facility: HOSPITAL | Age: 60
End: 2025-08-20
Payer: COMMERCIAL

## 2025-08-20 ENCOUNTER — OFFICE VISIT (OUTPATIENT)
Dept: GASTROENTEROLOGY | Facility: CLINIC | Age: 60
End: 2025-08-20
Payer: COMMERCIAL

## 2025-08-20 VITALS
HEIGHT: 60 IN | WEIGHT: 147 LBS | DIASTOLIC BLOOD PRESSURE: 75 MMHG | SYSTOLIC BLOOD PRESSURE: 126 MMHG | HEART RATE: 85 BPM | BODY MASS INDEX: 28.86 KG/M2

## 2025-08-20 DIAGNOSIS — R12 HEARTBURN: ICD-10-CM

## 2025-08-20 DIAGNOSIS — K31.1 PYLORIC STENOSIS: ICD-10-CM

## 2025-08-20 DIAGNOSIS — K20.80 LOS ANGELES GRADE B ESOPHAGITIS: ICD-10-CM

## 2025-08-20 DIAGNOSIS — R10.13 EPIGASTRIC PAIN: Primary | ICD-10-CM

## 2025-08-20 DIAGNOSIS — Z80.0 FAMILY HISTORY OF COLON CANCER: ICD-10-CM

## 2025-08-20 RX ORDER — ATORVASTATIN CALCIUM 20 MG/1
20 TABLET, FILM COATED ORAL DAILY
COMMUNITY
Start: 2025-06-27

## 2025-08-20 RX ORDER — DEXTROAMPHETAMINE SACCHARATE, AMPHETAMINE ASPARTATE, DEXTROAMPHETAMINE SULFATE AND AMPHETAMINE SULFATE 2.5; 2.5; 2.5; 2.5 MG/1; MG/1; MG/1; MG/1
TABLET ORAL
COMMUNITY

## 2025-08-20 RX ORDER — VONOPRAZAN FUMARATE 26.72 MG/1
20 TABLET ORAL DAILY
Qty: 10 TABLET | Refills: 0 | COMMUNITY
Start: 2025-08-20 | End: 2025-08-20

## 2025-08-20 RX ORDER — BUSPIRONE HYDROCHLORIDE 10 MG/1
1 TABLET ORAL EVERY 12 HOURS SCHEDULED
COMMUNITY
Start: 2025-07-24

## 2025-08-20 RX ORDER — VONOPRAZAN FUMARATE 26.72 MG/1
20 TABLET ORAL DAILY
Qty: 30 TABLET | Refills: 0 | Status: SHIPPED | OUTPATIENT
Start: 2025-08-20

## (undated) DEVICE — SNAR E/S POLYP SNAREMASTER OVL/10MM 2.8X2300MM YEL

## (undated) DEVICE — THE SINGLE USE ETRAP – POLYP TRAP IS USED FOR SUCTION RETRIEVAL OF ENDOSCOPICALLY REMOVED POLYPS.: Brand: ETRAP

## (undated) DEVICE — SUT VIC 2/0 CT1 36IN

## (undated) DEVICE — GLV SURG SENSICARE SLT PF LF 7 STRL

## (undated) DEVICE — GLV SURG BIOGEL LTX PF 7

## (undated) DEVICE — LINER SURG CANSTR SXN S/RIGD 1500CC

## (undated) DEVICE — Device

## (undated) DEVICE — Device: Brand: DEFENDO AIR/WATER/SUCTION AND BIOPSY VALVE

## (undated) DEVICE — SOLIDIFIER LIQLOC PLS 1500CC BT

## (undated) DEVICE — PENCL E/S SMOKEEVAC W/TELESCP CANN

## (undated) DEVICE — VAGINAL PACKING: Brand: DEROYAL

## (undated) DEVICE — SOL IRRG H2O PL/BG 1000ML STRL

## (undated) DEVICE — SYR LL TP 10ML STRL

## (undated) DEVICE — PREP TRAY WITH CHG: Brand: MEDLINE INDUSTRIES, INC.

## (undated) DEVICE — BLCK/BITE BLOX WO/DENTL/RIM W/STRAP 54F

## (undated) DEVICE — CONN JET HYDRA H20 AUXILIARY DISP

## (undated) DEVICE — INTENDED FOR TISSUE SEPARATION, AND OTHER PROCEDURES THAT REQUIRE A SHARP SURGICAL BLADE TO PUNCTURE OR CUT.: Brand: BARD-PARKER ® CARBON RIB-BACK BLADES

## (undated) DEVICE — SOL IRR NACL 0.9PCT BO 1000ML

## (undated) DEVICE — SLV SCD LEG COMFORT KENDALLSCD MD REPROC

## (undated) DEVICE — TOTAL TRAY, 16FR 10ML SIL FOLEY, URN: Brand: MEDLINE

## (undated) DEVICE — ADHS SKIN SURG TISS VISC PREMIERPRO EXOFIN HI/VISC FAST/DRY

## (undated) DEVICE — SOL IRRG H2O BG 3000ML STRL

## (undated) DEVICE — DRAPE,U/ SHT,SPLIT,PLAS,STERIL: Brand: MEDLINE

## (undated) DEVICE — 1016 S-DRAPE IRRIG POUCH 10/BOX: Brand: STERI-DRAPE™

## (undated) DEVICE — CYSTO/BLADDER IRRIGATION SET, REGULATING CLAMP

## (undated) DEVICE — SINGLE-USE BIOPSY FORCEPS: Brand: RADIAL JAW 4

## (undated) DEVICE — GLV SURG SENSICARE PI LF PF 7.5 GRN STRL

## (undated) DEVICE — DRAPE,UNDERBUTTOCKS,PCH,STERILE: Brand: MEDLINE

## (undated) DEVICE — GLV SURG BIOGEL LTX PF 7 1/2

## (undated) DEVICE — LEGGINGS, PAIR, CLEAR, STERILE: Brand: MEDLINE

## (undated) DEVICE — PAD SANI MAXI W/ADHS SNG WRP 11IN